# Patient Record
Sex: MALE | URBAN - METROPOLITAN AREA
[De-identification: names, ages, dates, MRNs, and addresses within clinical notes are randomized per-mention and may not be internally consistent; named-entity substitution may affect disease eponyms.]

---

## 2021-09-08 ENCOUNTER — HOSPITAL ENCOUNTER (EMERGENCY)
Facility: HOSPITAL | Age: 21
Discharge: LEFT BEFORE DISCHARGE | End: 2021-09-08

## 2021-09-08 VITALS
OXYGEN SATURATION: 98 % | WEIGHT: 180 LBS | HEART RATE: 53 BPM | SYSTOLIC BLOOD PRESSURE: 132 MMHG | RESPIRATION RATE: 16 BRPM | TEMPERATURE: 97.8 F | DIASTOLIC BLOOD PRESSURE: 62 MMHG

## 2021-09-08 NOTE — ED TRIAGE NOTES
Patient presents here with abdominal pain that he first noticed at 05:00am this morning. The pain is sharp in quality. He locates the pain to the bilateral lower quadrants. He arrived here via ambulance with [facility], Crew # [id number]. He has an IV in place and did receive Toradol IV. He notes that his pain has reduced since initial arrival.

## 2025-02-18 ENCOUNTER — HOSPITAL ENCOUNTER (INPATIENT)
Facility: CLINIC | Age: 25
LOS: 2 days | Discharge: HOME | End: 2025-02-20
Attending: EMERGENCY MEDICINE

## 2025-02-18 ENCOUNTER — APPOINTMENT (OUTPATIENT)
Dept: CT IMAGING | Facility: CLINIC | Age: 25
DRG: 439 | End: 2025-02-18
Attending: EMERGENCY MEDICINE

## 2025-02-18 DIAGNOSIS — K85.90 ACUTE PANCREATITIS, UNSPECIFIED COMPLICATION STATUS, UNSPECIFIED PANCREATITIS TYPE: ICD-10-CM

## 2025-02-18 LAB
ALBUMIN SERPL BCG-MCNC: 4.6 G/DL (ref 3.5–5.2)
ALP SERPL-CCNC: 124 U/L (ref 40–150)
ALT SERPL W P-5'-P-CCNC: 41 U/L (ref 0–70)
ANION GAP SERPL CALCULATED.3IONS-SCNC: 20 MMOL/L (ref 7–15)
AST SERPL W P-5'-P-CCNC: 52 U/L (ref 0–45)
BASOPHILS # BLD AUTO: 0.1 10E3/UL (ref 0–0.2)
BASOPHILS NFR BLD AUTO: 0 %
BILIRUB SERPL-MCNC: 1.4 MG/DL
BUN SERPL-MCNC: 10.9 MG/DL (ref 6–20)
CALCIUM SERPL-MCNC: 10.3 MG/DL (ref 8.8–10.4)
CHLORIDE SERPL-SCNC: 103 MMOL/L (ref 98–107)
CREAT SERPL-MCNC: 0.92 MG/DL (ref 0.67–1.17)
EGFRCR SERPLBLD CKD-EPI 2021: >90 ML/MIN/1.73M2
EOSINOPHIL # BLD AUTO: 0 10E3/UL (ref 0–0.7)
EOSINOPHIL NFR BLD AUTO: 0 %
ERYTHROCYTE [DISTWIDTH] IN BLOOD BY AUTOMATED COUNT: 14.2 % (ref 10–15)
GLUCOSE SERPL-MCNC: 122 MG/DL (ref 70–99)
HCO3 SERPL-SCNC: 17 MMOL/L (ref 22–29)
HCT VFR BLD AUTO: 43.2 % (ref 40–53)
HGB BLD-MCNC: 15.2 G/DL (ref 13.3–17.7)
HOLD SPECIMEN: NORMAL
HOLD SPECIMEN: NORMAL
IMM GRANULOCYTES # BLD: 0.1 10E3/UL
IMM GRANULOCYTES NFR BLD: 1 %
LIPASE SERPL-CCNC: 1149 U/L (ref 13–60)
LYMPHOCYTES # BLD AUTO: 1.1 10E3/UL (ref 0.8–5.3)
LYMPHOCYTES NFR BLD AUTO: 5 %
MAGNESIUM SERPL-MCNC: 1.7 MG/DL (ref 1.7–2.3)
MCH RBC QN AUTO: 29 PG (ref 26.5–33)
MCHC RBC AUTO-ENTMCNC: 35.2 G/DL (ref 31.5–36.5)
MCV RBC AUTO: 82 FL (ref 78–100)
MONOCYTES # BLD AUTO: 1 10E3/UL (ref 0–1.3)
MONOCYTES NFR BLD AUTO: 5 %
NEUTROPHILS # BLD AUTO: 19.3 10E3/UL (ref 1.6–8.3)
NEUTROPHILS NFR BLD AUTO: 90 %
NRBC # BLD AUTO: 0 10E3/UL
NRBC BLD AUTO-RTO: 0 /100
PLATELET # BLD AUTO: 283 10E3/UL (ref 150–450)
POTASSIUM SERPL-SCNC: 4.1 MMOL/L (ref 3.4–5.3)
PROT SERPL-MCNC: 7.9 G/DL (ref 6.4–8.3)
RBC # BLD AUTO: 5.24 10E6/UL (ref 4.4–5.9)
SODIUM SERPL-SCNC: 140 MMOL/L (ref 135–145)
WBC # BLD AUTO: 21.5 10E3/UL (ref 4–11)

## 2025-02-18 PROCEDURE — 250N000011 HC RX IP 250 OP 636: Performed by: EMERGENCY MEDICINE

## 2025-02-18 PROCEDURE — 258N000003 HC RX IP 258 OP 636: Performed by: EMERGENCY MEDICINE

## 2025-02-18 PROCEDURE — 85025 COMPLETE CBC W/AUTO DIFF WBC: CPT | Performed by: EMERGENCY MEDICINE

## 2025-02-18 PROCEDURE — 80053 COMPREHEN METABOLIC PANEL: CPT | Performed by: EMERGENCY MEDICINE

## 2025-02-18 PROCEDURE — 96375 TX/PRO/DX INJ NEW DRUG ADDON: CPT

## 2025-02-18 PROCEDURE — 96374 THER/PROPH/DIAG INJ IV PUSH: CPT

## 2025-02-18 PROCEDURE — 120N000001 HC R&B MED SURG/OB

## 2025-02-18 PROCEDURE — 83690 ASSAY OF LIPASE: CPT | Performed by: EMERGENCY MEDICINE

## 2025-02-18 PROCEDURE — 250N000013 HC RX MED GY IP 250 OP 250 PS 637: Performed by: EMERGENCY MEDICINE

## 2025-02-18 PROCEDURE — 74177 CT ABD & PELVIS W/CONTRAST: CPT

## 2025-02-18 PROCEDURE — 99285 EMERGENCY DEPT VISIT HI MDM: CPT | Mod: 25

## 2025-02-18 PROCEDURE — 83735 ASSAY OF MAGNESIUM: CPT | Performed by: EMERGENCY MEDICINE

## 2025-02-18 PROCEDURE — 96361 HYDRATE IV INFUSION ADD-ON: CPT

## 2025-02-18 PROCEDURE — 36415 COLL VENOUS BLD VENIPUNCTURE: CPT | Performed by: EMERGENCY MEDICINE

## 2025-02-18 PROCEDURE — 250N000011 HC RX IP 250 OP 636

## 2025-02-18 RX ORDER — HYDROMORPHONE HYDROCHLORIDE 1 MG/ML
0.5 INJECTION, SOLUTION INTRAMUSCULAR; INTRAVENOUS; SUBCUTANEOUS EVERY 30 MIN PRN
Status: COMPLETED | OUTPATIENT
Start: 2025-02-18 | End: 2025-02-19

## 2025-02-18 RX ORDER — HYDROMORPHONE HCL IN WATER/PF 6 MG/30 ML
0.4 PATIENT CONTROLLED ANALGESIA SYRINGE INTRAVENOUS
Status: DISCONTINUED | OUTPATIENT
Start: 2025-02-18 | End: 2025-02-20

## 2025-02-18 RX ORDER — ONDANSETRON 2 MG/ML
4 INJECTION INTRAMUSCULAR; INTRAVENOUS ONCE
Status: COMPLETED | OUTPATIENT
Start: 2025-02-18 | End: 2025-02-18

## 2025-02-18 RX ORDER — HYDROMORPHONE HCL IN WATER/PF 6 MG/30 ML
0.2 PATIENT CONTROLLED ANALGESIA SYRINGE INTRAVENOUS
Status: DISCONTINUED | OUTPATIENT
Start: 2025-02-18 | End: 2025-02-20

## 2025-02-18 RX ORDER — DICYCLOMINE HYDROCHLORIDE 10 MG/1
20 CAPSULE ORAL ONCE
Status: COMPLETED | OUTPATIENT
Start: 2025-02-18 | End: 2025-02-18

## 2025-02-18 RX ORDER — ONDANSETRON 4 MG/1
4 TABLET, ORALLY DISINTEGRATING ORAL EVERY 6 HOURS PRN
Status: DISCONTINUED | OUTPATIENT
Start: 2025-02-18 | End: 2025-02-20 | Stop reason: HOSPADM

## 2025-02-18 RX ORDER — AMOXICILLIN 250 MG
2 CAPSULE ORAL 2 TIMES DAILY PRN
Status: DISCONTINUED | OUTPATIENT
Start: 2025-02-18 | End: 2025-02-20 | Stop reason: HOSPADM

## 2025-02-18 RX ORDER — AMOXICILLIN 250 MG
1 CAPSULE ORAL 2 TIMES DAILY PRN
Status: DISCONTINUED | OUTPATIENT
Start: 2025-02-18 | End: 2025-02-20 | Stop reason: HOSPADM

## 2025-02-18 RX ORDER — PROCHLORPERAZINE MALEATE 10 MG
10 TABLET ORAL EVERY 6 HOURS PRN
Status: DISCONTINUED | OUTPATIENT
Start: 2025-02-18 | End: 2025-02-20 | Stop reason: HOSPADM

## 2025-02-18 RX ORDER — LIDOCAINE 40 MG/G
CREAM TOPICAL
Status: DISCONTINUED | OUTPATIENT
Start: 2025-02-18 | End: 2025-02-20 | Stop reason: HOSPADM

## 2025-02-18 RX ORDER — ONDANSETRON 2 MG/ML
4 INJECTION INTRAMUSCULAR; INTRAVENOUS EVERY 6 HOURS PRN
Status: DISCONTINUED | OUTPATIENT
Start: 2025-02-18 | End: 2025-02-20 | Stop reason: HOSPADM

## 2025-02-18 RX ORDER — HYDROMORPHONE HYDROCHLORIDE 1 MG/ML
0.5 INJECTION, SOLUTION INTRAMUSCULAR; INTRAVENOUS; SUBCUTANEOUS ONCE
Status: COMPLETED | OUTPATIENT
Start: 2025-02-18 | End: 2025-02-18

## 2025-02-18 RX ORDER — SODIUM CHLORIDE 9 MG/ML
INJECTION, SOLUTION INTRAVENOUS ONCE
Status: COMPLETED | OUTPATIENT
Start: 2025-02-18 | End: 2025-02-18

## 2025-02-18 RX ORDER — IOPAMIDOL 755 MG/ML
90 INJECTION, SOLUTION INTRAVASCULAR ONCE
Status: COMPLETED | OUTPATIENT
Start: 2025-02-18 | End: 2025-02-18

## 2025-02-18 RX ORDER — METOCLOPRAMIDE HYDROCHLORIDE 5 MG/ML
10 INJECTION INTRAMUSCULAR; INTRAVENOUS ONCE
Status: COMPLETED | OUTPATIENT
Start: 2025-02-18 | End: 2025-02-18

## 2025-02-18 RX ORDER — ACETAMINOPHEN 650 MG/1
650 SUPPOSITORY RECTAL EVERY 4 HOURS PRN
Status: DISCONTINUED | OUTPATIENT
Start: 2025-02-18 | End: 2025-02-20 | Stop reason: HOSPADM

## 2025-02-18 RX ORDER — ACETAMINOPHEN 325 MG/1
650 TABLET ORAL EVERY 4 HOURS PRN
Status: DISCONTINUED | OUTPATIENT
Start: 2025-02-18 | End: 2025-02-20 | Stop reason: HOSPADM

## 2025-02-18 RX ORDER — SODIUM CHLORIDE 9 MG/ML
INJECTION, SOLUTION INTRAVENOUS CONTINUOUS
Status: DISCONTINUED | OUTPATIENT
Start: 2025-02-18 | End: 2025-02-19

## 2025-02-18 RX ADMIN — IOPAMIDOL 90 ML: 755 INJECTION, SOLUTION INTRAVENOUS at 18:00

## 2025-02-18 RX ADMIN — HYDROMORPHONE HYDROCHLORIDE 0.5 MG: 1 INJECTION, SOLUTION INTRAMUSCULAR; INTRAVENOUS; SUBCUTANEOUS at 20:25

## 2025-02-18 RX ADMIN — SODIUM CHLORIDE: 9 INJECTION, SOLUTION INTRAVENOUS at 19:32

## 2025-02-18 RX ADMIN — FAMOTIDINE 20 MG: 10 INJECTION, SOLUTION INTRAVENOUS at 17:21

## 2025-02-18 RX ADMIN — HYDROMORPHONE HYDROCHLORIDE 0.5 MG: 1 INJECTION, SOLUTION INTRAMUSCULAR; INTRAVENOUS; SUBCUTANEOUS at 18:28

## 2025-02-18 RX ADMIN — ONDANSETRON 4 MG: 2 INJECTION, SOLUTION INTRAMUSCULAR; INTRAVENOUS at 18:27

## 2025-02-18 RX ADMIN — DICYCLOMINE HYDROCHLORIDE 20 MG: 10 CAPSULE ORAL at 18:29

## 2025-02-18 RX ADMIN — ONDANSETRON 4 MG: 2 INJECTION, SOLUTION INTRAMUSCULAR; INTRAVENOUS at 21:09

## 2025-02-18 RX ADMIN — METOCLOPRAMIDE 10 MG: 5 INJECTION, SOLUTION INTRAMUSCULAR; INTRAVENOUS at 17:21

## 2025-02-18 RX ADMIN — SODIUM CHLORIDE 1000 ML: 9 INJECTION, SOLUTION INTRAVENOUS at 17:20

## 2025-02-18 ASSESSMENT — COLUMBIA-SUICIDE SEVERITY RATING SCALE - C-SSRS
6. HAVE YOU EVER DONE ANYTHING, STARTED TO DO ANYTHING, OR PREPARED TO DO ANYTHING TO END YOUR LIFE?: NO
1. IN THE PAST MONTH, HAVE YOU WISHED YOU WERE DEAD OR WISHED YOU COULD GO TO SLEEP AND NOT WAKE UP?: NO
2. HAVE YOU ACTUALLY HAD ANY THOUGHTS OF KILLING YOURSELF IN THE PAST MONTH?: NO

## 2025-02-18 ASSESSMENT — ACTIVITIES OF DAILY LIVING (ADL)
ADLS_ACUITY_SCORE: 41

## 2025-02-18 NOTE — LETTER
[facility]  [address] [place] [id number]  Phone: [phone]  Fax: [phone]    February 20, 2025        Tylan D Severson  [address] [facility] [address]          To whom it may concern:    RE: Tylan D Severson    Patient was admitted and treated from 2/18-2/20 at [facility]. Please excuse him from work until 2/24/25.    Please contact me for questions or concerns.      Sincerely,      Mira Lal MD

## 2025-02-18 NOTE — ED PROVIDER NOTES
Emergency Department Encounter     Evaluation Date & Time:   No admission date for patient encounter.    CHIEF COMPLAINT:  Abdominal Pain and Nausea & Vomiting      Triage Note:Patient presents to the ED by [facility] for abdominal pain, nausea, vomiting since last night. Patient ate ramen for dinner and has been sick since. Patient has had this in the past (2 years ago) and states it may have been an ulcer. Patient got 4 mg zofran via EMS. Patient's pain is 8/10 and is burning.              ED COURSE & MEDICAL DECISION MAKING:     Pt here for evaluation of n/v/d since last night after eating Ramen. Denies any similar sick contacts.  Having lower abdominal pain/cramping. Denies fevers, afebrile here.  Pt uncomfortable on exam, but relatively benign abdomen. Will get labs, treat symptomatically.  Denies heavy cannabis or recent heavy etoh use.  No previous presentations for this. Consideration for gastroenteritis vs colitis/enteritis vs other pathology.  Likely needs symptomatic cares in Ed, home with meds pending workup.    ED Course as of 02/18/25 2010 Tue Feb 18, 2025 1732 I met the patient and performed my initial interview and exam.   1755 Lipase elevated to 1149, consistent with pancreatitis.  Will plan for hospitalization.  IV dilaudid ordered for pain control.   1830 Ct showing pancreatitis, consistent with labs. Pt in transfer queue.     1831 Pt resting in bed in hallway.   1914 No beds in system, will admit here.  Pt updated, agreeable.   1940 I spoke with [facility] resident, accepts for med/surg hospitalization.  Pt aware of plan, agreeable.  Overall improved with pain/nausea meds here.         Medical Decision Making    History:  Supplemental history from: Documented in chart  External Record(s) reviewed: Documented in chart    Work Up:  Chart documentation includes differential considered and any EKGs or imaging independently interpreted by provider, where specified.  In additional to work up  documented, I considered the following work up: Documented in chart, if applicable.    External consultation:  Discussion of management with another provider: Documented in chart, if applicable    Complicating factors:  Care impacted by chronic illness: Documented in Chart  Care affected by social determinants of health: N/A    Disposition considerations: Admit.    Not Applicable     At the conclusion of the encounter I discussed the results of all the tests and the disposition. The questions were answered. The patient or family acknowledged understanding and was agreeable with the care plan.      MEDICATIONS GIVEN IN THE EMERGENCY DEPARTMENT:  Medications   HYDROmorphone (PF) (DILAUDID) injection 0.5 mg (has no administration in time range)   metoclopramide (REGLAN) injection 10 mg (10 mg Intravenous $Given 2/18/25 1721)   famotidine (PEPCID) injection 20 mg (20 mg Intravenous $Given 2/18/25 1721)   sodium chloride 0.9% BOLUS 1,000 mL (0 mLs Intravenous Stopped 2/18/25 1831)   ondansetron (ZOFRAN) injection 4 mg (4 mg Intravenous $Given 2/18/25 1827)   dicyclomine (BENTYL) capsule 20 mg (20 mg Oral $Given 2/18/25 1829)   iopamidol (ISOVUE-370) solution 90 mL (90 mLs Intravenous $Given 2/18/25 1800)   HYDROmorphone (PF) (DILAUDID) injection 0.5 mg (0.5 mg Intravenous $Given 2/18/25 1828)   sodium chloride 0.9 % infusion ( Intravenous $New Bag 2/18/25 1932)       NEW PRESCRIPTIONS STARTED AT TODAY'S ED VISIT:  New Prescriptions    No medications on file       HPI   HPI     Tylan D Severson is a 24 year old male with no pertinent history on file who presents to this ED via EMS for evaluation of abdominal pain with nausea/vomiting.    Last night, the patient started having diffuse abdominal pain with more pain in the lower section that worsens with inhalation. This was accompanied by nausea and non-bloody emesis. He also had watery diarrhea without blood, too. Today, the pain continued and worsened. He was unable to  "eat today, but was able to keep down ibuprofen and Tylenol though these didn't help; last medication use was 1.5 hours ago. He presents for this ongoing pain.     He had no new foods yesterday. He was recently around family who had RSV and walking pneumonia. He has no history of abdominal surgeries, and denies recent fever, cough, rhinorrhea, or congestion.     He last had alcohol on 14-Feb-2025. He denies regular use of cannabis.    REVIEW OF SYSTEMS:  See HPI      Medical History   History reviewed. No pertinent past medical history.    History reviewed. No pertinent surgical history.    History reviewed. No pertinent family history.         No current outpatient medications on file.      Physical Exam     Vitals:  /63   Pulse 73   Temp 97.7  F (36.5  C) (Oral)   Resp 22   Ht 1.854 m (6' 1\")   Wt 93 kg (205 lb)   SpO2 100%   BMI 27.05 kg/m      PHYSICAL EXAM:   Physical Exam  Vitals and nursing note reviewed.   Constitutional:       General: He is not in acute distress.     Appearance: Normal appearance.   HENT:      Head: Normocephalic and atraumatic.      Nose: Nose normal.      Mouth/Throat:      Mouth: Mucous membranes are moist.   Eyes:      Pupils: Pupils are equal, round, and reactive to light.   Cardiovascular:      Rate and Rhythm: Normal rate and regular rhythm.      Pulses: Normal pulses.           Radial pulses are 2+ on the right side and 2+ on the left side.        Dorsalis pedis pulses are 2+ on the right side and 2+ on the left side.   Pulmonary:      Effort: Pulmonary effort is normal. No respiratory distress.      Breath sounds: Normal breath sounds.   Abdominal:      General: There is no distension.      Palpations: Abdomen is soft.      Tenderness: There is abdominal tenderness (lower abdomen, mild).      Comments: No abdominal rigidity   Musculoskeletal:      Cervical back: Full passive range of motion without pain and neck supple.      Comments: No calf tenderness or swelling b/l "   Skin:     General: Skin is warm.      Findings: No rash.   Neurological:      General: No focal deficit present.      Mental Status: He is alert. Mental status is at baseline.      Comments: Fluent speech, no acute lateralizing deficits   Psychiatric:         Mood and Affect: Mood is anxious.         Behavior: Behavior normal.         Results     LAB:  All pertinent labs reviewed and interpreted  Labs Ordered and Resulted from Time of ED Arrival to Time of ED Departure   COMPREHENSIVE METABOLIC PANEL - Abnormal       Result Value    Sodium 140      Potassium 4.1      Carbon Dioxide (CO2) 17 (*)     Anion Gap 20 (*)     Urea Nitrogen 10.9      Creatinine 0.92      GFR Estimate >90      Calcium 10.3      Chloride 103      Glucose 122 (*)     Alkaline Phosphatase 124      AST 52 (*)     ALT 41      Protein Total 7.9      Albumin 4.6      Bilirubin Total 1.4 (*)    LIPASE - Abnormal    Lipase 1,149 (*)    CBC WITH PLATELETS AND DIFFERENTIAL - Abnormal    WBC Count 21.5 (*)     RBC Count 5.24      Hemoglobin 15.2      Hematocrit 43.2      MCV 82      MCH 29.0      MCHC 35.2      RDW 14.2      Platelet Count 283      % Neutrophils 90      % Lymphocytes 5      % Monocytes 5      % Eosinophils 0      % Basophils 0      % Immature Granulocytes 1      NRBCs per 100 WBC 0      Absolute Neutrophils 19.3 (*)     Absolute Lymphocytes 1.1      Absolute Monocytes 1.0      Absolute Eosinophils 0.0      Absolute Basophils 0.1      Absolute Immature Granulocytes 0.1      Absolute NRBCs 0.0     MAGNESIUM - Normal    Magnesium 1.7         RADIOLOGY:  CT Abdomen Pelvis w Contrast   Final Result   IMPRESSION:    1.  Acute interstitial pancreatitis. Currently no evidence for complications.   2.  Fatty liver.   3.  Small amount of free pelvic fluid.                   ECG:  none    PROCEDURES:  Procedures:  none      FINAL IMPRESSION:    ICD-10-CM    1. Acute pancreatitis, unspecified complication status, unspecified pancreatitis type   K85.90           0 minutes of critical care time      I, Gordon Erickson, am serving as a scribe to document services personally performed by Dr. New Silva, based on my observations and the provider's statements to me. I, New Silva, DO attest that Gordon Erickson is acting in a scribe capacity, has observed my performance of the services and has documented them in accordance with my direction.      New Silva DO  Emergency Medicine  [facility] EMERGENCY ROOM  2/18/2025  5:14 PM          New Silva MD  02/18/25 2010

## 2025-02-18 NOTE — ED TRIAGE NOTES
Patient presents to the ED by [facility] for abdominal pain, nausea, vomiting since last night. Patient ate ramen for dinner and has been sick since. Patient has had this in the past (2 years ago) and states it may have been an ulcer. Patient got 4 mg zofran via EMS. Patient's pain is 8/10 and is burning.

## 2025-02-19 LAB
ALBUMIN SERPL BCG-MCNC: 4 G/DL (ref 3.5–5.2)
ALP SERPL-CCNC: 96 U/L (ref 40–150)
ALT SERPL W P-5'-P-CCNC: 28 U/L (ref 0–70)
ANION GAP SERPL CALCULATED.3IONS-SCNC: 13 MMOL/L (ref 7–15)
AST SERPL W P-5'-P-CCNC: 37 U/L (ref 0–45)
BASOPHILS # BLD AUTO: 0 10E3/UL (ref 0–0.2)
BASOPHILS NFR BLD AUTO: 0 %
BILIRUB SERPL-MCNC: 1.2 MG/DL
BUN SERPL-MCNC: 9.3 MG/DL (ref 6–20)
CALCIUM SERPL-MCNC: 9 MG/DL (ref 8.8–10.4)
CHLORIDE SERPL-SCNC: 102 MMOL/L (ref 98–107)
CHOLEST SERPL-MCNC: 197 MG/DL
CREAT SERPL-MCNC: 0.84 MG/DL (ref 0.67–1.17)
EGFRCR SERPLBLD CKD-EPI 2021: >90 ML/MIN/1.73M2
EOSINOPHIL # BLD AUTO: 0 10E3/UL (ref 0–0.7)
EOSINOPHIL NFR BLD AUTO: 0 %
ERYTHROCYTE [DISTWIDTH] IN BLOOD BY AUTOMATED COUNT: 14.4 % (ref 10–15)
FLUAV RNA SPEC QL NAA+PROBE: NEGATIVE
FLUBV RNA RESP QL NAA+PROBE: NEGATIVE
GLUCOSE SERPL-MCNC: 118 MG/DL (ref 70–99)
HCO3 SERPL-SCNC: 23 MMOL/L (ref 22–29)
HCT VFR BLD AUTO: 38.2 % (ref 40–53)
HDLC SERPL-MCNC: 57 MG/DL
HGB BLD-MCNC: 13.6 G/DL (ref 13.3–17.7)
IMM GRANULOCYTES # BLD: 0.1 10E3/UL
IMM GRANULOCYTES NFR BLD: 1 %
LDLC SERPL CALC-MCNC: 118 MG/DL
LYMPHOCYTES # BLD AUTO: 1.2 10E3/UL (ref 0.8–5.3)
LYMPHOCYTES NFR BLD AUTO: 7 %
MCH RBC QN AUTO: 29.6 PG (ref 26.5–33)
MCHC RBC AUTO-ENTMCNC: 35.6 G/DL (ref 31.5–36.5)
MCV RBC AUTO: 83 FL (ref 78–100)
MONOCYTES # BLD AUTO: 1 10E3/UL (ref 0–1.3)
MONOCYTES NFR BLD AUTO: 6 %
NEUTROPHILS # BLD AUTO: 15 10E3/UL (ref 1.6–8.3)
NEUTROPHILS NFR BLD AUTO: 87 %
NONHDLC SERPL-MCNC: 140 MG/DL
NRBC # BLD AUTO: 0 10E3/UL
NRBC BLD AUTO-RTO: 0 /100
PLATELET # BLD AUTO: 200 10E3/UL (ref 150–450)
POTASSIUM SERPL-SCNC: 3.8 MMOL/L (ref 3.4–5.3)
PROT SERPL-MCNC: 6.7 G/DL (ref 6.4–8.3)
RBC # BLD AUTO: 4.6 10E6/UL (ref 4.4–5.9)
RSV RNA SPEC NAA+PROBE: NEGATIVE
SARS-COV-2 RNA RESP QL NAA+PROBE: NEGATIVE
SODIUM SERPL-SCNC: 138 MMOL/L (ref 135–145)
TRIGL SERPL-MCNC: 109 MG/DL
WBC # BLD AUTO: 17.3 10E3/UL (ref 4–11)

## 2025-02-19 PROCEDURE — 250N000011 HC RX IP 250 OP 636: Mod: JZ

## 2025-02-19 PROCEDURE — 258N000003 HC RX IP 258 OP 636

## 2025-02-19 PROCEDURE — 250N000011 HC RX IP 250 OP 636

## 2025-02-19 PROCEDURE — 120N000001 HC R&B MED SURG/OB

## 2025-02-19 PROCEDURE — 85041 AUTOMATED RBC COUNT: CPT

## 2025-02-19 PROCEDURE — 36415 COLL VENOUS BLD VENIPUNCTURE: CPT

## 2025-02-19 PROCEDURE — 87637 SARSCOV2&INF A&B&RSV AMP PRB: CPT

## 2025-02-19 PROCEDURE — 99222 1ST HOSP IP/OBS MODERATE 55: CPT | Mod: AI

## 2025-02-19 PROCEDURE — 250N000013 HC RX MED GY IP 250 OP 250 PS 637

## 2025-02-19 PROCEDURE — 85004 AUTOMATED DIFF WBC COUNT: CPT

## 2025-02-19 PROCEDURE — 80061 LIPID PANEL: CPT

## 2025-02-19 PROCEDURE — 84075 ASSAY ALKALINE PHOSPHATASE: CPT

## 2025-02-19 PROCEDURE — 82310 ASSAY OF CALCIUM: CPT

## 2025-02-19 RX ORDER — NALOXONE HYDROCHLORIDE 0.4 MG/ML
0.4 INJECTION, SOLUTION INTRAMUSCULAR; INTRAVENOUS; SUBCUTANEOUS
Status: DISCONTINUED | OUTPATIENT
Start: 2025-02-19 | End: 2025-02-20 | Stop reason: HOSPADM

## 2025-02-19 RX ORDER — NALOXONE HYDROCHLORIDE 0.4 MG/ML
0.2 INJECTION, SOLUTION INTRAMUSCULAR; INTRAVENOUS; SUBCUTANEOUS
Status: DISCONTINUED | OUTPATIENT
Start: 2025-02-19 | End: 2025-02-20 | Stop reason: HOSPADM

## 2025-02-19 RX ADMIN — HYDROMORPHONE HYDROCHLORIDE 0.4 MG: 0.2 INJECTION, SOLUTION INTRAMUSCULAR; INTRAVENOUS; SUBCUTANEOUS at 14:54

## 2025-02-19 RX ADMIN — SODIUM CHLORIDE: 9 INJECTION, SOLUTION INTRAVENOUS at 03:16

## 2025-02-19 RX ADMIN — HYDROMORPHONE HYDROCHLORIDE 0.4 MG: 0.2 INJECTION, SOLUTION INTRAMUSCULAR; INTRAVENOUS; SUBCUTANEOUS at 09:14

## 2025-02-19 RX ADMIN — HYDROMORPHONE HYDROCHLORIDE 0.4 MG: 0.2 INJECTION, SOLUTION INTRAMUSCULAR; INTRAVENOUS; SUBCUTANEOUS at 00:14

## 2025-02-19 RX ADMIN — SODIUM CHLORIDE: 9 INJECTION, SOLUTION INTRAVENOUS at 14:44

## 2025-02-19 RX ADMIN — SENNOSIDES AND DOCUSATE SODIUM 2 TABLET: 50; 8.6 TABLET ORAL at 14:52

## 2025-02-19 RX ADMIN — HYDROMORPHONE HYDROCHLORIDE 0.5 MG: 1 INJECTION, SOLUTION INTRAMUSCULAR; INTRAVENOUS; SUBCUTANEOUS at 03:19

## 2025-02-19 RX ADMIN — ONDANSETRON 4 MG: 2 INJECTION, SOLUTION INTRAMUSCULAR; INTRAVENOUS at 19:20

## 2025-02-19 RX ADMIN — ONDANSETRON 4 MG: 2 INJECTION, SOLUTION INTRAMUSCULAR; INTRAVENOUS at 00:15

## 2025-02-19 RX ADMIN — HYDROMORPHONE HYDROCHLORIDE 0.4 MG: 0.2 INJECTION, SOLUTION INTRAMUSCULAR; INTRAVENOUS; SUBCUTANEOUS at 19:20

## 2025-02-19 RX ADMIN — PROCHLORPERAZINE EDISYLATE 10 MG: 5 INJECTION INTRAMUSCULAR; INTRAVENOUS at 03:20

## 2025-02-19 RX ADMIN — ONDANSETRON 4 MG: 2 INJECTION, SOLUTION INTRAMUSCULAR; INTRAVENOUS at 09:15

## 2025-02-19 RX ADMIN — PROCHLORPERAZINE EDISYLATE 10 MG: 5 INJECTION INTRAMUSCULAR; INTRAVENOUS at 14:54

## 2025-02-19 ASSESSMENT — ACTIVITIES OF DAILY LIVING (ADL)
ADLS_ACUITY_SCORE: 46
ADLS_ACUITY_SCORE: 41
ADLS_ACUITY_SCORE: 46
ADLS_ACUITY_SCORE: 41
ADLS_ACUITY_SCORE: 46
ADLS_ACUITY_SCORE: 41
ADLS_ACUITY_SCORE: 46
ADLS_ACUITY_SCORE: 41
ADLS_ACUITY_SCORE: 41

## 2025-02-19 NOTE — H&P
"    [facility]    History and Physical - Hospitalist Service       Date of Admission:  2/18/2025    Assessment & Plan      Tylan D Severson is a 24 year old male admitted on 2/18/2025. He has no pertinent past medical history and is admitted for acute pancreatitis    Emergency department workup:  -White blood cell count elevated 21.5.  Lipase elevated at 1149.  Bilirubin mildly elevated at 1.4.  Mild AST elevation at 52.  ALT normal at 41.  CT abdomen pelvis 2/18/2025   IMPRESSION:   1.  Acute interstitial pancreatitis. Currently no evidence for complications.  2.  Fatty liver.  3.  Small amount of free pelvic fluid.      Acute interstitial pancreatitis  -Admit to inpatient  -IV fluids normal saline 125 mL/h  -Clear liquid diet  -Pain management with heat or ice, Tylenol, IV Dilaudid.  Recommend transition to p.o. control when able  -Zofran/Compazine as needed for nausea  -AM CBC  -AM CMP  -AM Lipid  -Social work consulted           Diet: Combination Diet Clear Liquid  DVT Prophylaxis: Ambulate every shift  Hernandez Catheter: Not present  Fluids: IVF  Lines: None     Cardiac Monitoring: None  Code Status: Full Code    Clinically Significant Risk Factors Present on Admission              # Anion Gap Metabolic Acidosis: Highest Anion Gap = 20 mmol/L in last 2 days, will monitor and treat as appropriate                # Overweight: Estimated body mass index is 27.05 kg/m  as calculated from the following:    Height as of this encounter: 1.854 m (6' 1\").    Weight as of this encounter: 93 kg (205 lb).              Disposition Plan      Expected Discharge Date: 02/20/2025                The patient's care was discussed with the Attending Physician, Dr. Robert Steiner MD. Patient will be seen in Am by Dr. Issa Pang MD .      Casey Cramer DO  Hospitalist Service  [facility]  Securely message with [employer] (more info)  Text page via [employer] Paging/Directory "   ______________________________________________________________________    Chief Complaint   Abdominal Pain    History is obtained from the patient    History of Present Illness   Tylan D Severson is a 24 year old male who presents with 1 day history of diffuse abdominal pain, present in the epigastric region.  He reports that he consumed about 5-6 shots of alcohol on 2/14/2025, but no alcohol since then.  He reports that he regularly consumes alcohol, typically on the weekends and when he does so, he has about 5 or 6 standard alcoholic beverages.  Reports in 2021 he presented to the emergency department with abdominal pain, but left prior to being seen.  Reports no other chronic medical concerns.  He denies recreational drug use.  Denies tobacco use, but does vape.  Affirms abdominal pain, nausea.  He denies headache, fever, chills, shortness of breath, chest pain.      Past Medical History    History reviewed. No pertinent past medical history.    Past Surgical History   History reviewed. No pertinent surgical history.    Prior to Admission Medications   None        Physical Exam   Vital Signs: Temp: 97.7  F (36.5  C) Temp src: Oral BP: 132/63 Pulse: 73   Resp: 22 SpO2: 100 % O2 Device: None (Room air)    Weight: 205 lbs 0 oz    Physical Exam  Constitutional:       General: He is not in acute distress.     Appearance: He is not toxic-appearing or diaphoretic.   HENT:      Head: Normocephalic and atraumatic.      Right Ear: External ear normal.      Left Ear: External ear normal.      Nose: Nose normal.      Mouth/Throat:      Mouth: Mucous membranes are moist.   Eyes:      General: No scleral icterus.     Conjunctiva/sclera: Conjunctivae normal.   Cardiovascular:      Rate and Rhythm: Normal rate and regular rhythm.      Heart sounds: No murmur heard.     No friction rub. No gallop.   Pulmonary:      Effort: Pulmonary effort is normal. No respiratory distress.      Breath sounds: No wheezing or rales.    Abdominal:      General: There is no distension.      Tenderness: There is abdominal tenderness. There is guarding.      Comments: Tenderness to palpation of epigastric region and lower abdominal quadrants.    Musculoskeletal:      Right lower leg: No edema.      Left lower leg: No edema.   Neurological:      General: No focal deficit present.      Mental Status: He is alert and oriented to person, place, and time.   Psychiatric:         Mood and Affect: Mood normal.         Behavior: Behavior normal.          Medical Decision Making       Please see A&P for additional details of medical decision making.      Data     I have personally reviewed the following data over the past 24 hrs:    21.5 (H)  \   15.2   / 283     140 103 10.9 /  122 (H)   4.1 17 (L) 0.92 \     ALT: 41 AST: 52 (H) AP: 124 TBILI: 1.4 (H)   ALB: 4.6 TOT PROTEIN: 7.9 LIPASE: 1,149 (H)       Imaging results reviewed over the past 24 hrs:   Recent Results (from the past 24 hours)   CT Abdomen Pelvis w Contrast    Narrative    EXAM: CT ABDOMEN PELVIS W CONTRAST  LOCATION: [facility]  DATE: 2/18/2025    INDICATION: vomiting, abdominal pain, leukocytosis  COMPARISON: None.  TECHNIQUE: CT scan of the abdomen and pelvis was performed following injection of IV contrast. Multiplanar reformats were obtained. Dose reduction techniques were used.  CONTRAST: 90ml Isovue 370    FINDINGS:   LOWER CHEST: Normal.    HEPATOBILIARY: Mild diffuse fatty infiltration of the liver.    PANCREAS: Inflammatory changes involving the head, body, and tail of the pancreas with edema extending into the adjacent mesentery and along the right and left  pararenal fascia. No evidence for pancreatic necrosis. No localized fluid collections.    SPLEEN: Normal.    ADRENAL GLANDS: Normal.    KIDNEYS/BLADDER: Normal.    BOWEL: Narrowing of the gastric antrum and descending portion of the duodenum adjacent to the inflamed pancreatic head. Currently no evidence  for gastric obstruction. Normal appendix.    LYMPH NODES: Normal.    VASCULATURE: Normal.    PELVIC ORGANS: Small amount of free pelvic fluid.    MUSCULOSKELETAL: Normal.      Impression    IMPRESSION:   1.  Acute interstitial pancreatitis. Currently no evidence for complications.  2.  Fatty liver.  3.  Small amount of free pelvic fluid.

## 2025-02-19 NOTE — ED NOTES
Bed: [facility]ED-06  Expected date: 2/18/25  Expected time: 8:25 PM  Means of arrival:   Comments:  Bed B

## 2025-02-19 NOTE — ED NOTES
PRIMARY DIAGNOSIS: ACUTE PAIN  OUTPATIENT/OBSERVATION GOALS TO BE MET BEFORE DISCHARGE:  1. Pain Status: Improved but still requiring IV narcotics.    2. Return to near baseline physical activity: No    3. Cleared for discharge by consultants (if involved): No    Discharge Planner Nurse   Safe discharge environment identified: Yes  Barriers to discharge:  Patient still complaining of abdominal pain 8/10 and nausea pain managed with PRN dilaudid. Patient also endorses abdominal bloating and constipation. PRN senna administered. Patient tried clear liquids but developed severe pain and nausea after eating. Will keep clear liquids tonight and try to advance diet tomorrow.         Entered by: Kranthi Carter RN 02/19/2025 5:55 PM     Please review provider order for any additional goals.   Nurse to notify provider when observation goals have been met and patient is ready for discharge.

## 2025-02-19 NOTE — PROGRESS NOTES
"[facility]    Progress Note - Hospitalist Service       Date of Admission:  2/18/2025    Assessment & Plan   Tylan D Severson is a 24 year old male admitted on 2/18/2025. He has no significant past medical history and is admitted for acute pancreatitis.     Acute Interstitial Pancreatitis  Presented with acute worsening epigastric pain on 2/18, ED workup revealed elevated lipase to 1149 and CT abdomen notable for acute interstitial pancreatitis without complication. CMP within normal limits suggesting away from gallstone pancreatitis or hypercalcemia, no recent trauma or instrumentation. Triglycerides WNL. He endorses binge drinking on the weekend, alcoholic pancreatitis most likely.   - IV  mL/h   - Clear liquid diet, advance as tolerated  - Pain control:   - Heat/Ice  - PO Tylenol 650 q4h PRN  - IV Dilaudid 0.2-0.4 q2h PRN  - Zofran and compazine for nausea          Diet: Advance Diet as Tolerated: Clear Liquid Diet; Regular Diet Adult    DVT Prophylaxis: Ambulate every shift  Hernandez Catheter: Not present  Fluids: IV  ml/h  Lines: None     Cardiac Monitoring: None  Code Status: Full Code      Clinically Significant Risk Factors Present on Admission              # Anion Gap Metabolic Acidosis: Highest Anion Gap = 20 mmol/L in last 2 days, will monitor and treat as appropriate                # Overweight: Estimated body mass index is 27.05 kg/m  as calculated from the following:    Height as of this encounter: 1.854 m (6' 1\").    Weight as of this encounter: 93 kg (205 lb).              Social Drivers of Health   Tobacco Use: Medium Risk (6/10/2022)    Received from [facility]    Patient History     Smoking Tobacco Use: Former     Smokeless Tobacco Use: Never    Received from [employer]    Social Connections         Disposition Plan     Medically Ready for Discharge: Anticipated Tomorrow       The patient's " care was discussed with the Attending Physician, Dr. Pang .    Mira Lal MD  Hospitalist Service  [facility]  Securely message with [employer] (more info)  Text page via [email or web address] Paging/Directory   ______________________________________________________________________    Interval History   No acute events following admission, still experiencing considerable epigastric pain. Attempted to drink broth with increase in pain. Nausea relieved by antiemetics ordered. He endorses drinking 6-7 shots of alcohol once a weekend in a social setting. He is motivated by his current pain to further reduce or stop drinking alcohol.     Physical Exam   Vital Signs: Temp: 98.6  F (37  C) Temp src: Oral BP: (!) 168/98 Pulse: 103   Resp: 19 SpO2: 98 % O2 Device: None (Room air)    Weight: 205 lbs 0 oz    Physical Exam  Constitutional:       Appearance: He is obese.   Cardiovascular:      Rate and Rhythm: Normal rate and regular rhythm.      Pulses: Normal pulses.      Heart sounds: No murmur heard.  Pulmonary:      Effort: Pulmonary effort is normal.      Breath sounds: Normal breath sounds.   Abdominal:      General: Abdomen is flat. There is no distension.      Palpations: Abdomen is soft.      Tenderness: There is abdominal tenderness. There is guarding. There is no rebound.      Comments: Mid-epigastric, LLQ, RLQ pain, voluntary and anticipatory guarding   Musculoskeletal:      Right lower leg: No edema.      Left lower leg: No edema.   Skin:     General: Skin is warm and dry.   Neurological:      Mental Status: He is alert.         Data     I have personally reviewed the following data over the past 24 hrs:    17.3 (H)  \   13.6   / 200     138 102 9.3 /  118 (H)   3.8 23 0.84 \     ALT: 28 AST: 37 AP: 96 TBILI: 1.2   ALB: 4.0 TOT PROTEIN: 6.7 LIPASE: 1,149 (H)       Imaging results reviewed over the past 24 hrs:   Recent Results (from the past 24 hours)   CT Abdomen Pelvis w Contrast    Narrative     EXAM: CT ABDOMEN PELVIS W CONTRAST  LOCATION: [facility]  DATE: 2/18/2025    INDICATION: vomiting, abdominal pain, leukocytosis  COMPARISON: None.  TECHNIQUE: CT scan of the abdomen and pelvis was performed following injection of IV contrast. Multiplanar reformats were obtained. Dose reduction techniques were used.  CONTRAST: 90ml Isovue 370    FINDINGS:   LOWER CHEST: Normal.    HEPATOBILIARY: Mild diffuse fatty infiltration of the liver.    PANCREAS: Inflammatory changes involving the head, body, and tail of the pancreas with edema extending into the adjacent mesentery and along the right and left  pararenal fascia. No evidence for pancreatic necrosis. No localized fluid collections.    SPLEEN: Normal.    ADRENAL GLANDS: Normal.    KIDNEYS/BLADDER: Normal.    BOWEL: Narrowing of the gastric antrum and descending portion of the duodenum adjacent to the inflamed pancreatic head. Currently no evidence for gastric obstruction. Normal appendix.    LYMPH NODES: Normal.    VASCULATURE: Normal.    PELVIC ORGANS: Small amount of free pelvic fluid.    MUSCULOSKELETAL: Normal.      Impression    IMPRESSION:   1.  Acute interstitial pancreatitis. Currently no evidence for complications.  2.  Fatty liver.  3.  Small amount of free pelvic fluid.

## 2025-02-19 NOTE — MEDICATION SCRIBE - ADMISSION MEDICATION HISTORY
Medication Scribe Admission Medication History    Admission medication history is complete. The information provided in this note is only as accurate as the sources available at the time of the update.    Information Source(s): Patient and CareEverywhere/SureScripts via in-person    Pertinent Information: Patient reports no Rx medications and no OTC products at this time. No known allergies per patient.     No dispensed medications found on file and does not appear to be seen regularly in clinic correlating with the patient reporting no Rx/OTC medications at this time.     Changes made to PTA medication list:  Added: None  Deleted: None  Changed: None    Allergies reviewed with patient and updates made in EHR: yes    Medication History Completed By: Eron Garsia 2/18/2025 7:35 PM    No outpatient medications have been marked as taking for the 2/18/25 encounter (Hospital Encounter).

## 2025-02-20 VITALS
WEIGHT: 205 LBS | BODY MASS INDEX: 27.17 KG/M2 | TEMPERATURE: 99.2 F | HEIGHT: 73 IN | OXYGEN SATURATION: 96 % | HEART RATE: 103 BPM | SYSTOLIC BLOOD PRESSURE: 136 MMHG | DIASTOLIC BLOOD PRESSURE: 75 MMHG | RESPIRATION RATE: 16 BRPM

## 2025-02-20 PROCEDURE — 250N000011 HC RX IP 250 OP 636: Mod: JZ

## 2025-02-20 PROCEDURE — 250N000013 HC RX MED GY IP 250 OP 250 PS 637

## 2025-02-20 PROCEDURE — 99238 HOSP IP/OBS DSCHRG MGMT 30/<: CPT | Mod: GC

## 2025-02-20 RX ORDER — ONDANSETRON 4 MG/1
4 TABLET, ORALLY DISINTEGRATING ORAL EVERY 6 HOURS PRN
Qty: 30 TABLET | Refills: 0 | Status: SHIPPED | OUTPATIENT
Start: 2025-02-20 | End: 2025-02-20

## 2025-02-20 RX ORDER — METOCLOPRAMIDE 5 MG/1
10 TABLET ORAL EVERY 6 HOURS PRN
Status: DISCONTINUED | OUTPATIENT
Start: 2025-02-20 | End: 2025-02-20

## 2025-02-20 RX ORDER — SIMETHICONE 80 MG
80 TABLET,CHEWABLE ORAL EVERY 6 HOURS PRN
Status: DISCONTINUED | OUTPATIENT
Start: 2025-02-20 | End: 2025-02-20 | Stop reason: HOSPADM

## 2025-02-20 RX ORDER — ONDANSETRON 4 MG/1
4 TABLET, ORALLY DISINTEGRATING ORAL EVERY 6 HOURS PRN
Qty: 30 TABLET | Refills: 0 | Status: SHIPPED | OUTPATIENT
Start: 2025-02-20

## 2025-02-20 RX ORDER — OXYCODONE HYDROCHLORIDE 5 MG/1
5 TABLET ORAL EVERY 4 HOURS PRN
Qty: 5 TABLET | Refills: 0 | Status: SHIPPED | OUTPATIENT
Start: 2025-02-20

## 2025-02-20 RX ORDER — OXYCODONE HYDROCHLORIDE 5 MG/1
5 TABLET ORAL EVERY 4 HOURS PRN
Status: DISCONTINUED | OUTPATIENT
Start: 2025-02-20 | End: 2025-02-20 | Stop reason: HOSPADM

## 2025-02-20 RX ADMIN — OXYCODONE 5 MG: 5 TABLET ORAL at 10:14

## 2025-02-20 RX ADMIN — SIMETHICONE 80 MG: 80 TABLET, CHEWABLE ORAL at 11:08

## 2025-02-20 RX ADMIN — OXYCODONE 5 MG: 5 TABLET ORAL at 14:52

## 2025-02-20 RX ADMIN — HYDROMORPHONE HYDROCHLORIDE 0.4 MG: 0.2 INJECTION, SOLUTION INTRAMUSCULAR; INTRAVENOUS; SUBCUTANEOUS at 05:28

## 2025-02-20 RX ADMIN — HYDROMORPHONE HYDROCHLORIDE 0.4 MG: 0.2 INJECTION, SOLUTION INTRAMUSCULAR; INTRAVENOUS; SUBCUTANEOUS at 01:16

## 2025-02-20 ASSESSMENT — ACTIVITIES OF DAILY LIVING (ADL)
ADLS_ACUITY_SCORE: 46
ADLS_ACUITY_SCORE: 46
ADLS_ACUITY_SCORE: 51
ADLS_ACUITY_SCORE: 25
ADLS_ACUITY_SCORE: 46
ADLS_ACUITY_SCORE: 25
ADLS_ACUITY_SCORE: 51
ADLS_ACUITY_SCORE: 51
ADLS_ACUITY_SCORE: 46
ADLS_ACUITY_SCORE: 51
ADLS_ACUITY_SCORE: 25
ADLS_ACUITY_SCORE: 46
ADLS_ACUITY_SCORE: 51
ADLS_ACUITY_SCORE: 46
ADLS_ACUITY_SCORE: 25

## 2025-02-20 NOTE — PROGRESS NOTES
Plan for patient to discharge today, ok to discharger per MD, patient given work note per request.  Patient's sister, Jazmine given update and will provide transport. Report given to Ronald RHODES.

## 2025-02-20 NOTE — PLAN OF CARE
"Goal Outcome Evaluation:  Patient alert and oriented to room, call light system, rounding.  Patient reported abdominal pain 8, given prn po oxycodone.  Patient now resting.  Plan to give prn simethicone once available for gas pains per patient request.  Patient's VS: BP (!) 155/91 (BP Location: Right arm, Patient Position: Semi-Mcclure's, Cuff Size: Adult Regular)   Pulse 103   Temp 98.3  F (36.8  C) (Oral)   Resp 16   Ht 1.854 m (6' 1\")   Wt 93 kg (205 lb)   SpO2 98%   BMI 27.05 kg/m  on room air, continuous oximetry in use.  Patient denies dizziness, tolerating ambulating independently.  Plan for patient to try regular diet.  Discharge pending.         Problem: Adult Inpatient Plan of Care  Goal: Absence of Hospital-Acquired Illness or Injury  Intervention: Identify and Manage Fall Risk  Recent Flowsheet Documentation  Taken 2/20/2025 1000 by Nadia Lee RN  Safety Promotion/Fall Prevention: safety round/check completed  Taken 2/20/2025 0935 by Nadia Lee RN  Safety Promotion/Fall Prevention:   clutter free environment maintained   increased rounding and observation   increase visualization of patient   lighting adjusted   mobility aid in reach   nonskid shoes/slippers when out of bed   patient and family education   room near nurse's station   room organization consistent   safety round/check completed   supervised activity   toileting scheduled     Problem: Pancreatitis  Goal: Optimal Pain Control and Function  Intervention: Monitor and Manage Pain  Recent Flowsheet Documentation  Taken 2/20/2025 0935 by Nadia Lee RN  Pain Management Interventions:   repositioned   medication (see MAR)  Sleep/Rest Enhancement: natural light exposure provided                    "

## 2025-02-20 NOTE — DISCHARGE SUMMARY
"[facility]  Discharge Summary - Medicine & Pediatrics       Date of Admission:  2/18/2025  Date of Discharge:  2/20/2025  Discharging Provider: Issa Pang MD  Discharge Service: Hospitalist Service    Discharge Diagnoses   Acute interstitial pancreatitis    Clinically Significant Risk Factors     # Overweight: Estimated body mass index is 27.05 kg/m  as calculated from the following:    Height as of this encounter: 1.854 m (6' 1\").    Weight as of this encounter: 93 kg (205 lb).       Follow-ups Needed After Discharge   Follow-up Appointments       Follow-up and recommended labs and tests       Follow up with primary care provider, Physician No Ref-Primary, within 7 days for hospital follow- up.  No follow up labs or test are needed.   Please follow up on high blood pressure which was noted throughout admission.            PCP to do:  -No new daily medications were started, patient was hypertensive requiring outpatient monitoring.    Unresulted Labs Ordered in the Past 30 Days of this Admission       No orders found from 1/19/2025 to 2/19/2025.            Discharge Disposition   Discharged to home  Condition at discharge: Stable    Hospital Course   Tylan D Severson was admitted on 2/18/2025 for acute interstitial pancreatitis.  The following problems were addressed during his hospitalization:    Deny had worsening epigastric pain on 2/18.  ED workup positive for elevated lipase and CT abdomen showed acute interstitial pancreatitis without complication.  Differential for etiology included gallstone pancreatitis, hypertriglyceridemia, hypercalcemia, or recent trauma.  Clinical workup was negative for the above.  Patient endorsed binge drinking behavior on 2/14, which most likely contributed to this acute episode of pancreatitis.  This is the first time he has been hospitalized for this problem, however he has had pain such as this in the past.    He was started on maintenance fluid, " given IV pain medication, and advanced his diet as tolerated.  On the day of discharge she was tolerating oral pain medication and was able to drink enough to maintain hydration.  He was also tolerating a soft diet.  Nausea was well-controlled with PRNs.  Deny was comfortable with discharge, and was informed of the risk of recurrent pancreatitis if he were to continue that his current level of alcohol consumption.    Consultations This Hospital Stay   CARE MANAGEMENT / SOCIAL WORK IP CONSULT    Code Status   Full Code       The patient was discussed with Dr. Bird Lal MD  Resident hospitalist service  [address] [address]  Phone: [phone]  Fax: [phone]  ______________________________________________________________________    Physical Exam   Vital Signs: Temp: 99.2  F (37.3  C) Temp src: Oral BP: 136/75 Pulse: 103   Resp: 16 SpO2: 96 % O2 Device: None (Room air)    Weight: 205 lbs 0 oz  Physical Exam  Constitutional:       General: He is not in acute distress.     Appearance: He is obese.   Cardiovascular:      Rate and Rhythm: Normal rate and regular rhythm.      Pulses: Normal pulses.   Pulmonary:      Effort: Pulmonary effort is normal.      Breath sounds: Normal breath sounds.   Abdominal:      General: Abdomen is flat. Bowel sounds are normal.      Palpations: Abdomen is soft.      Tenderness: There is abdominal tenderness. There is no guarding or rebound.      Comments: Diffuse tenderness to palpation, most intense in the mid epigastric region, left lower quadrant, and right lower quadrant.   Musculoskeletal:      Right lower leg: No edema.      Left lower leg: No edema.   Skin:     General: Skin is warm and dry.   Neurological:      Mental Status: He is alert and oriented to person, place, and time.             Primary Care Physician   Physician No Ref-Primary    Discharge Orders      Reason for your hospital  stay    Acute pancreatitis     Follow-up and recommended labs and tests     Follow up with primary care provider, Physician No Ref-Primary, within 7 days for hospital follow- up.  No follow up labs or test are needed.   Please follow up on high blood pressure which was noted throughout admission.     Activity    Your activity upon discharge: activity as tolerated     Diet    Follow this diet upon discharge: Current Diet:Orders Placed This Encounter      Advance Diet as Tolerated: Clear Liquid Diet; Regular Diet Adult       Significant Results and Procedures   Results for orders placed or performed during the hospital encounter of 02/18/25   CT Abdomen Pelvis w Contrast    Narrative    EXAM: CT ABDOMEN PELVIS W CONTRAST  LOCATION: [facility]  DATE: 2/18/2025    INDICATION: vomiting, abdominal pain, leukocytosis  COMPARISON: None.  TECHNIQUE: CT scan of the abdomen and pelvis was performed following injection of IV contrast. Multiplanar reformats were obtained. Dose reduction techniques were used.  CONTRAST: 90ml Isovue 370    FINDINGS:   LOWER CHEST: Normal.    HEPATOBILIARY: Mild diffuse fatty infiltration of the liver.    PANCREAS: Inflammatory changes involving the head, body, and tail of the pancreas with edema extending into the adjacent mesentery and along the right and left  pararenal fascia. No evidence for pancreatic necrosis. No localized fluid collections.    SPLEEN: Normal.    ADRENAL GLANDS: Normal.    KIDNEYS/BLADDER: Normal.    BOWEL: Narrowing of the gastric antrum and descending portion of the duodenum adjacent to the inflamed pancreatic head. Currently no evidence for gastric obstruction. Normal appendix.    LYMPH NODES: Normal.    VASCULATURE: Normal.    PELVIC ORGANS: Small amount of free pelvic fluid.    MUSCULOSKELETAL: Normal.      Impression    IMPRESSION:   1.  Acute interstitial pancreatitis. Currently no evidence for complications.  2.  Fatty liver.  3.  Small  amount of free pelvic fluid.       Discharge Medications   Current Discharge Medication List        START taking these medications    Details   ondansetron (ZOFRAN ODT) 4 MG ODT tab Take 1 tablet (4 mg) by mouth every 6 hours as needed for nausea or vomiting.  Qty: 30 tablet, Refills: 0    Associated Diagnoses: Acute pancreatitis, unspecified complication status, unspecified pancreatitis type      oxyCODONE (ROXICODONE) 5 MG tablet Take 1 tablet (5 mg) by mouth every 4 hours as needed for moderate pain.  Qty: 5 tablet, Refills: 0    Associated Diagnoses: Acute pancreatitis, unspecified complication status, unspecified pancreatitis type           Allergies   No Known Allergies

## 2025-02-20 NOTE — PROVIDER NOTIFICATION
Gorge BLANCO    WW-ED 06; Severson Tylan, MRN: [id number]: Patient Dx is pancreatitis. He has been on 125 ml/hr NS since yesterday, is taking fluids. Current diet is clear liquid to be advanced as tolerated. Is it okay to discontinue the IVF or decrease the flow rate? Thanks    MD has discontinued the continuous IVF.

## 2025-02-20 NOTE — PROGRESS NOTES
Patient is discharge home in stable condition. Accompanied by sister. AVS reviewed with patient and sister; verbalized understanding.Written education on pancreatitis and low fiber diet also given to patient. Valuables and belongings sent home with patient.

## 2025-02-20 NOTE — PLAN OF CARE
Problem: Pancreatitis  Goal: Optimal Pain Control and Function  Intervention: Monitor and Manage Pain  Recent Flowsheet Documentation  Taken 2/19/2025 1918 by Terrance Renee RN  Pain Management Interventions: medication (see MAR)     Problem: Pancreatitis  Goal: Fluid and Electrolyte Balance  Outcome: Progressing   Goal Outcome Evaluation:         Report received from outgoing nurse at shift change, introduced self to patient, appears A&O X4, is vitally stable on room air. Initial assessment done ( See flow sheets), medication orders effected. Bed locked in low position, white board updated. C/o 7/10 abdominal pain, IV Dilaudid administered and IV Zofran for nausea with relief from Sx. IVF discontinued after consulting resident. Patient able to tolerate fluids. He has a rash on his face that id itchy, no adjustments needed. He has abdominal tenderness and guarding, is passing gas.   Nursing patient education done on use of call light. Call light within reach, patient acknowledges understanding, calls appropriately for cares.

## 2025-04-18 ENCOUNTER — HOSPITAL ENCOUNTER (EMERGENCY)
Facility: CLINIC | Age: 25
Discharge: HOME | End: 2025-04-18

## 2025-04-18 ENCOUNTER — APPOINTMENT (OUTPATIENT)
Dept: ULTRASOUND IMAGING | Facility: CLINIC | Age: 25
End: 2025-04-18

## 2025-04-18 ENCOUNTER — APPOINTMENT (OUTPATIENT)
Dept: CT IMAGING | Facility: CLINIC | Age: 25
End: 2025-04-18

## 2025-04-18 VITALS
DIASTOLIC BLOOD PRESSURE: 107 MMHG | RESPIRATION RATE: 24 BRPM | TEMPERATURE: 98 F | OXYGEN SATURATION: 100 % | WEIGHT: 200 LBS | SYSTOLIC BLOOD PRESSURE: 169 MMHG | HEART RATE: 99 BPM | HEIGHT: 74 IN | BODY MASS INDEX: 25.67 KG/M2

## 2025-04-18 DIAGNOSIS — K85.90 ACUTE PANCREATITIS: ICD-10-CM

## 2025-04-18 LAB
ALBUMIN SERPL BCG-MCNC: 4.8 G/DL (ref 3.5–5.2)
ALBUMIN UR-MCNC: 30 MG/DL
ALP SERPL-CCNC: 110 U/L (ref 40–150)
ALT SERPL W P-5'-P-CCNC: 71 U/L (ref 0–70)
ANION GAP SERPL CALCULATED.3IONS-SCNC: 13 MMOL/L (ref 7–15)
APPEARANCE UR: CLEAR
AST SERPL W P-5'-P-CCNC: 42 U/L (ref 0–45)
BASOPHILS # BLD AUTO: 0 10E3/UL (ref 0–0.2)
BASOPHILS NFR BLD AUTO: 0 %
BILIRUB DIRECT SERPL-MCNC: 0.18 MG/DL (ref 0–0.3)
BILIRUB SERPL-MCNC: 0.4 MG/DL
BILIRUB UR QL STRIP: NEGATIVE
BUN SERPL-MCNC: 5.9 MG/DL (ref 6–20)
CALCIUM SERPL-MCNC: 10.1 MG/DL (ref 8.8–10.4)
CHLORIDE SERPL-SCNC: 104 MMOL/L (ref 98–107)
COLOR UR AUTO: YELLOW
CREAT SERPL-MCNC: 0.75 MG/DL (ref 0.67–1.17)
CRP SERPL-MCNC: <3 MG/L
EGFRCR SERPLBLD CKD-EPI 2021: >90 ML/MIN/1.73M2
EOSINOPHIL # BLD AUTO: 0 10E3/UL (ref 0–0.7)
EOSINOPHIL NFR BLD AUTO: 0 %
ERYTHROCYTE [DISTWIDTH] IN BLOOD BY AUTOMATED COUNT: 13.1 % (ref 10–15)
GLUCOSE SERPL-MCNC: 103 MG/DL (ref 70–99)
GLUCOSE UR STRIP-MCNC: NEGATIVE MG/DL
HCO3 SERPL-SCNC: 24 MMOL/L (ref 22–29)
HCT VFR BLD AUTO: 41.9 % (ref 40–53)
HGB BLD-MCNC: 14.7 G/DL (ref 13.3–17.7)
HGB UR QL STRIP: NEGATIVE
IMM GRANULOCYTES # BLD: 0 10E3/UL
IMM GRANULOCYTES NFR BLD: 0 %
KETONES UR STRIP-MCNC: NEGATIVE MG/DL
LEUKOCYTE ESTERASE UR QL STRIP: NEGATIVE
LIPASE SERPL-CCNC: 132 U/L (ref 13–60)
LYMPHOCYTES # BLD AUTO: 1.4 10E3/UL (ref 0.8–5.3)
LYMPHOCYTES NFR BLD AUTO: 12 %
MCH RBC QN AUTO: 28.7 PG (ref 26.5–33)
MCHC RBC AUTO-ENTMCNC: 35.1 G/DL (ref 31.5–36.5)
MCV RBC AUTO: 82 FL (ref 78–100)
MONOCYTES # BLD AUTO: 0.6 10E3/UL (ref 0–1.3)
MONOCYTES NFR BLD AUTO: 5 %
MUCOUS THREADS #/AREA URNS LPF: PRESENT /LPF
NEUTROPHILS # BLD AUTO: 9.9 10E3/UL (ref 1.6–8.3)
NEUTROPHILS NFR BLD AUTO: 83 %
NITRATE UR QL: NEGATIVE
NRBC # BLD AUTO: 0 10E3/UL
NRBC BLD AUTO-RTO: 0 /100
PH UR STRIP: 6.5 [PH] (ref 5–7)
PLATELET # BLD AUTO: 384 10E3/UL (ref 150–450)
POTASSIUM SERPL-SCNC: 4.2 MMOL/L (ref 3.4–5.3)
PROT SERPL-MCNC: 8.4 G/DL (ref 6.4–8.3)
RBC # BLD AUTO: 5.12 10E6/UL (ref 4.4–5.9)
RBC URINE: 1 /HPF
SODIUM SERPL-SCNC: 141 MMOL/L (ref 135–145)
SP GR UR STRIP: 1.02 (ref 1–1.03)
SQUAMOUS EPITHELIAL: <1 /HPF
UROBILINOGEN UR STRIP-MCNC: NORMAL MG/DL
WBC # BLD AUTO: 11.9 10E3/UL (ref 4–11)
WBC URINE: 3 /HPF

## 2025-04-18 PROCEDURE — 36415 COLL VENOUS BLD VENIPUNCTURE: CPT

## 2025-04-18 PROCEDURE — 96361 HYDRATE IV INFUSION ADD-ON: CPT

## 2025-04-18 PROCEDURE — 74177 CT ABD & PELVIS W/CONTRAST: CPT

## 2025-04-18 PROCEDURE — 86140 C-REACTIVE PROTEIN: CPT

## 2025-04-18 PROCEDURE — 80053 COMPREHEN METABOLIC PANEL: CPT

## 2025-04-18 PROCEDURE — 81003 URINALYSIS AUTO W/O SCOPE: CPT | Performed by: EMERGENCY MEDICINE

## 2025-04-18 PROCEDURE — 76705 ECHO EXAM OF ABDOMEN: CPT

## 2025-04-18 PROCEDURE — 83690 ASSAY OF LIPASE: CPT

## 2025-04-18 PROCEDURE — 96375 TX/PRO/DX INJ NEW DRUG ADDON: CPT

## 2025-04-18 PROCEDURE — 81001 URINALYSIS AUTO W/SCOPE: CPT | Performed by: EMERGENCY MEDICINE

## 2025-04-18 PROCEDURE — 85004 AUTOMATED DIFF WBC COUNT: CPT

## 2025-04-18 PROCEDURE — 250N000011 HC RX IP 250 OP 636: Mod: JZ

## 2025-04-18 PROCEDURE — 258N000003 HC RX IP 258 OP 636

## 2025-04-18 PROCEDURE — 96374 THER/PROPH/DIAG INJ IV PUSH: CPT | Mod: 59

## 2025-04-18 PROCEDURE — 82248 BILIRUBIN DIRECT: CPT

## 2025-04-18 PROCEDURE — 99285 EMERGENCY DEPT VISIT HI MDM: CPT | Mod: 25

## 2025-04-18 PROCEDURE — 96376 TX/PRO/DX INJ SAME DRUG ADON: CPT

## 2025-04-18 PROCEDURE — 85041 AUTOMATED RBC COUNT: CPT

## 2025-04-18 PROCEDURE — 250N000011 HC RX IP 250 OP 636

## 2025-04-18 RX ORDER — KETOROLAC TROMETHAMINE 15 MG/ML
15 INJECTION, SOLUTION INTRAMUSCULAR; INTRAVENOUS ONCE
Status: COMPLETED | OUTPATIENT
Start: 2025-04-18 | End: 2025-04-18

## 2025-04-18 RX ORDER — OXYCODONE HYDROCHLORIDE 5 MG/1
5 TABLET ORAL EVERY 6 HOURS PRN
Qty: 12 TABLET | Refills: 0 | Status: SHIPPED | OUTPATIENT
Start: 2025-04-18 | End: 2025-04-21

## 2025-04-18 RX ORDER — ONDANSETRON 4 MG/1
4 TABLET, ORALLY DISINTEGRATING ORAL EVERY 8 HOURS PRN
Qty: 20 TABLET | Refills: 0 | Status: SHIPPED | OUTPATIENT
Start: 2025-04-18

## 2025-04-18 RX ORDER — ONDANSETRON 2 MG/ML
4 INJECTION INTRAMUSCULAR; INTRAVENOUS ONCE
Status: COMPLETED | OUTPATIENT
Start: 2025-04-18 | End: 2025-04-18

## 2025-04-18 RX ORDER — IOPAMIDOL 755 MG/ML
90 INJECTION, SOLUTION INTRAVASCULAR ONCE
Status: COMPLETED | OUTPATIENT
Start: 2025-04-18 | End: 2025-04-18

## 2025-04-18 RX ORDER — HYDROMORPHONE HYDROCHLORIDE 1 MG/ML
0.5 INJECTION, SOLUTION INTRAMUSCULAR; INTRAVENOUS; SUBCUTANEOUS ONCE
Status: COMPLETED | OUTPATIENT
Start: 2025-04-18 | End: 2025-04-18

## 2025-04-18 RX ADMIN — IOPAMIDOL 90 ML: 755 INJECTION, SOLUTION INTRAVENOUS at 14:03

## 2025-04-18 RX ADMIN — SODIUM CHLORIDE 1000 ML: 0.9 INJECTION, SOLUTION INTRAVENOUS at 13:02

## 2025-04-18 RX ADMIN — ONDANSETRON 4 MG: 2 INJECTION, SOLUTION INTRAMUSCULAR; INTRAVENOUS at 14:16

## 2025-04-18 RX ADMIN — KETOROLAC TROMETHAMINE 15 MG: 15 INJECTION, SOLUTION INTRAMUSCULAR; INTRAVENOUS at 13:02

## 2025-04-18 RX ADMIN — HYDROMORPHONE HYDROCHLORIDE 0.5 MG: 1 INJECTION, SOLUTION INTRAMUSCULAR; INTRAVENOUS; SUBCUTANEOUS at 14:16

## 2025-04-18 RX ADMIN — HYDROMORPHONE HYDROCHLORIDE 0.5 MG: 1 INJECTION, SOLUTION INTRAMUSCULAR; INTRAVENOUS; SUBCUTANEOUS at 17:31

## 2025-04-18 ASSESSMENT — ACTIVITIES OF DAILY LIVING (ADL)
ADLS_ACUITY_SCORE: 51

## 2025-04-18 ASSESSMENT — COLUMBIA-SUICIDE SEVERITY RATING SCALE - C-SSRS
1. IN THE PAST MONTH, HAVE YOU WISHED YOU WERE DEAD OR WISHED YOU COULD GO TO SLEEP AND NOT WAKE UP?: NO
2. HAVE YOU ACTUALLY HAD ANY THOUGHTS OF KILLING YOURSELF IN THE PAST MONTH?: NO
6. HAVE YOU EVER DONE ANYTHING, STARTED TO DO ANYTHING, OR PREPARED TO DO ANYTHING TO END YOUR LIFE?: NO

## 2025-04-18 NOTE — DISCHARGE INSTRUCTIONS
You are seen in the emergency department for abdominal pain.  Your work appears reassuring but does show that you have mild inflammation of your pancreas which I do think is causing your symptoms.  It can be from alcohol use.  We did do an ultrasound of your gallbladder to be sure that it was not from gallstones.  You do have some gallstones in your bladder but no evidence of obstructing stones or inflammation of the gallbladder so I do not think the gallbladder is the cause of the pancreatitis.  Will treat your pain and nausea with medications.  Try to rest your bowels today by just having clear liquids including Gatorade, water, soup/broth.  Tomorrow you can slowly advance your diet with some mild food items.  If you develop any new or worsening symptoms, return to the emergency department.  
88.5

## 2025-04-18 NOTE — Clinical Note
Tylan Severson was seen and treated in our emergency department on 4/18/2025.  He may return to work on 04/21/2025.       If you have any questions or concerns, please don't hesitate to call.      Alejandrina Mehta PA-C

## 2025-04-18 NOTE — ED PROVIDER NOTES
EMERGENCY DEPARTMENT ENCOUNTER      NAME: Tylan D Severson  AGE: 24 year old male  YOB: 2000  MRN: [id number]  EVALUATION DATE & TIME: No admission date for patient encounter.    PCP: No Ref-Primary, Physician    ED PROVIDER: Alejandrina Mehta PA-C    CHIEF COMPLAINT  Flank Pain and Abdominal Pain      FINAL IMPRESSION:      ICD-10-CM    1. Acute pancreatitis  K85.90           MEDICAL DECISION MAKING AND ED COURSE:  Pertinent Labs & Imaging studies reviewed (See chart for details)     Tylan D Severson is a 24 year old male with a pertinent history of alcoholic pancreatitis who presents to this ED by walk in for evaluation of abdominal and flank pain.  Hypertensive at 169/98.  Afebrile.  No tachycardia, hypoxia, tachypnea.  On exam, patient is uncomfortable appearing, tearful but nontoxic.  No distress.  Tenderness to palpation to the epigastric and left upper quadrant as well as suprapubic region without guarding or rebound.  No CVA tenderness.  Cardiopulmonary examination unremarkable. Considering pancreatitis, ureterolithiasis, cholecystitis, choledocholithiasis, small bowel obstruction, UTI, among many considered.  Will obtain, imaging, and give meds.    Urine without blood or evidence of infection.  CRP less than 3. Mild ALT elevation at 71 but normal AST and bilirubin.  Electrolytes, kidney function, anion gap within normal limits.  Mild leukocytosis 11.9.  No anemia.  CT abdomen pelvis with changes suggestive of a mild interstitial pancreatitis. Incidental, short segment of a proximal small bowel intussusception in the left upper quadrant without any obstruction or inflammatory changes. Spoke to Dr. Mojica with general surgery who felt that this will resolve on its own and was not concerning.  When I updated the patient on the results and checked his pain level, he reported he was now having right upper quadrant pain which he was not having on my initial exam and was tender with guarding on  palpation.  Although the CT did not show any evidence of acute cholecystitis, I want to pursue ultrasound imaging as it is a better imaging study for this region.  Right upper quadrant ultrasound with few, small mobile gallstones without evidence of cholecystitis. Right upper quadrant and midline epigastric pain to transducer pressure is due to the mild changes of pancreatitis.     At this point, findings consistent with acute pancreatitis, likely alcoholic.  Patient reports that after his last hospitalization, he stopped drinking for about 1 month and then started drinking again.  Not as heavily as he was prior to that but still some.  Last drink was a few days ago.  Right before the pain started.  He is, however, tolerating p.o. here and pain is improved with IV pain medications.  Discussed admission versus discharge and patient felt like he could manage his symptoms at home which I think is reasonable given that his lipase is not 3 times upper limit of normal.  Likely is trending down given that he has had several days of abdominal pain.  Will recommend clear liquid diet and slowly progressing his diet, oxycodone, Zofran, and strict return precautions.  I did offer a GI follow-up with patient is uninsured and said he would not be able to attend the appointment.  He was discharged in stable condition.  All questions answered.    MEDICATIONS GIVEN IN THE EMERGENCY:  Medications   sodium chloride 0.9% BOLUS 1,000 mL (0 mLs Intravenous Stopped 4/18/25 1505)   ketorolac (TORADOL) injection 15 mg (15 mg Intravenous $Given 4/18/25 1302)   HYDROmorphone (PF) (DILAUDID) injection 0.5 mg (0.5 mg Intravenous $Given 4/18/25 1416)   ondansetron (ZOFRAN) injection 4 mg (4 mg Intravenous $Given 4/18/25 1416)   iopamidol (ISOVUE-370) solution 90 mL (90 mLs Intravenous $Given 4/18/25 1403)   HYDROmorphone (PF) (DILAUDID) injection 0.5 mg (0.5 mg Intravenous $Given 4/18/25 1731)       NEW PRESCRIPTIONS STARTED AT TODAY'S ER  "VISIT  New Prescriptions    ONDANSETRON (ZOFRAN ODT) 4 MG ODT TAB    Take 1 tablet (4 mg) by mouth every 8 hours as needed for nausea or vomiting.    OXYCODONE (ROXICODONE) 5 MG TABLET    Take 1 tablet (5 mg) by mouth every 6 hours as needed for pain.     Modified Medications    No medications on file       =================================================================    HPI    Patient information was obtained from: patient   Use of : N/A     Tylan D Severson is a 24 year old male with a pertinent history of alcoholic pancreatitis who presents to this ED by walk in for evaluation of abdominal and flank pain.     Patient reports bilateral flank/back pain x 2-3 days and at 4am woke up with severe periumbilical/suprapubic abdominal pain. Feels the pain moves from back to front to back but also wraps around. Reports nausea and vomiting. Denies diarrhea or constipation, melena, hematochezia, hematemesis. Reports decreased urinary output but denies hematuria, dysuria, frequency, urgency, fevers, cough, rhinorrhea, congestion, sore throat, chest pain, shortness of breath. Denies hx of similar symptoms. Uses marijuana recreationally, not daily. Used to drink alcohol more often but after pancreatitis in February, cut back. Last drink a few days ago, one glass of wine.     PHYSICAL EXAM    BP (!) 169/98   Pulse 86   Temp 98  F (36.7  C) (Oral)   Resp 24   Ht 1.88 m (6' 2\")   Wt 90.7 kg (200 lb)   SpO2 100%   BMI 25.68 kg/m    Physical Exam  Vitals and nursing note reviewed.   Constitutional:       General: He is not in acute distress.     Appearance: He is not ill-appearing.      Comments: Uncomfortable appearing, tearful, in no distress   HENT:      Mouth/Throat:      Mouth: Mucous membranes are moist.   Eyes:      Extraocular Movements: Extraocular movements intact.   Cardiovascular:      Rate and Rhythm: Normal rate and regular rhythm.      Heart sounds: Normal heart sounds.   Pulmonary:      Effort: " Pulmonary effort is normal. No respiratory distress.      Breath sounds: Normal breath sounds. No stridor. No wheezing, rhonchi or rales.   Chest:      Chest wall: No tenderness.   Abdominal:      General: Abdomen is flat. There is no distension.      Palpations: Abdomen is soft.      Tenderness: There is abdominal tenderness in the right upper quadrant, epigastric area, suprapubic area and left upper quadrant. There is guarding. There is no right CVA tenderness, left CVA tenderness or rebound.      Hernia: No hernia is present.   Skin:     General: Skin is warm.      Capillary Refill: Capillary refill takes less than 2 seconds.   Neurological:      General: No focal deficit present.      Mental Status: He is alert.   Psychiatric:         Mood and Affect: Mood is anxious.          LAB:  All pertinent labs reviewed and interpreted.  Results for orders placed or performed during the hospital encounter of 04/18/25   CT Abdomen Pelvis w Contrast    Impression    IMPRESSION:   1.  Changes suggestive of a very, mild interstitial pancreatitis. Far more extensive changes pancreatitis noted on the February study.  2.  Incidental, short segment of a proximal small bowel intussusception in the left upper quadrant without any obstruction or inflammatory changes. These can be seen transiently in patients.   US Abdomen Limited    Impression    IMPRESSION:  1.  There are a few, small mobile gallstones without evidence of cholecystitis.  2.  Right upper quadrant and midline  epigastric pain to transducer pressure is due to the mild changes of pancreatitis.       UA with Microscopic reflex to Culture    Specimen: Urine, Midstream   Result Value Ref Range    Color Urine Yellow Colorless, Straw, Light Yellow, Yellow    Appearance Urine Clear Clear    Glucose Urine Negative Negative mg/dL    Bilirubin Urine Negative Negative    Ketones Urine Negative Negative mg/dL    Specific Gravity Urine 1.024 1.001 - 1.030    Blood Urine Negative  Negative    pH Urine 6.5 5.0 - 7.0    Protein Albumin Urine 30 (A) Negative mg/dL    Urobilinogen Urine Normal Normal mg/dL    Nitrite Urine Negative Negative    Leukocyte Esterase Urine Negative Negative    Mucus Urine Present (A) None Seen /LPF    RBC Urine 1 <=2 /HPF    WBC Urine 3 <=5 /HPF    Squamous Epithelials Urine <1 <=1 /HPF   Basic metabolic panel   Result Value Ref Range    Sodium 141 135 - 145 mmol/L    Potassium 4.2 3.4 - 5.3 mmol/L    Chloride 104 98 - 107 mmol/L    Carbon Dioxide (CO2) 24 22 - 29 mmol/L    Anion Gap 13 7 - 15 mmol/L    Urea Nitrogen 5.9 (L) 6.0 - 20.0 mg/dL    Creatinine 0.75 0.67 - 1.17 mg/dL    GFR Estimate >90 >60 mL/min/1.73m2    Calcium 10.1 8.8 - 10.4 mg/dL    Glucose 103 (H) 70 - 99 mg/dL   Result Value Ref Range    CRP Inflammation <3.00 <5.00 mg/L   Hepatic function panel   Result Value Ref Range    Protein Total 8.4 (H) 6.4 - 8.3 g/dL    Albumin 4.8 3.5 - 5.2 g/dL    Bilirubin Total 0.4 <=1.2 mg/dL    Alkaline Phosphatase 110 40 - 150 U/L    AST 42 0 - 45 U/L    ALT 71 (H) 0 - 70 U/L    Bilirubin Direct 0.18 0.00 - 0.30 mg/dL   Result Value Ref Range    Lipase 132 (H) 13 - 60 U/L   CBC with platelets and differential   Result Value Ref Range    WBC Count 11.9 (H) 4.0 - 11.0 10e3/uL    RBC Count 5.12 4.40 - 5.90 10e6/uL    Hemoglobin 14.7 13.3 - 17.7 g/dL    Hematocrit 41.9 40.0 - 53.0 %    MCV 82 78 - 100 fL    MCH 28.7 26.5 - 33.0 pg    MCHC 35.1 31.5 - 36.5 g/dL    RDW 13.1 10.0 - 15.0 %    Platelet Count 384 150 - 450 10e3/uL    % Neutrophils 83 %    % Lymphocytes 12 %    % Monocytes 5 %    % Eosinophils 0 %    % Basophils 0 %    % Immature Granulocytes 0 %    NRBCs per 100 WBC 0 <1 /100    Absolute Neutrophils 9.9 (H) 1.6 - 8.3 10e3/uL    Absolute Lymphocytes 1.4 0.8 - 5.3 10e3/uL    Absolute Monocytes 0.6 0.0 - 1.3 10e3/uL    Absolute Eosinophils 0.0 0.0 - 0.7 10e3/uL    Absolute Basophils 0.0 0.0 - 0.2 10e3/uL    Absolute Immature Granulocytes 0.0 <=0.4 10e3/uL     Absolute NRBCs 0.0 10e3/uL       RADIOLOGY:  Reviewed all pertinent imaging. Please see official radiology report.  US Abdomen Limited   Final Result   IMPRESSION:   1.  There are a few, small mobile gallstones without evidence of cholecystitis.   2.  Right upper quadrant and midline  epigastric pain to transducer pressure is due to the mild changes of pancreatitis.            CT Abdomen Pelvis w Contrast   Final Result   IMPRESSION:    1.  Changes suggestive of a very, mild interstitial pancreatitis. Far more extensive changes pancreatitis noted on the February study.   2.  Incidental, short segment of a proximal small bowel intussusception in the left upper quadrant without any obstruction or inflammatory changes. These can be seen transiently in patients.          Medical Decision Making  I reviewed the EMR: Outpatient Record: Hospitalization 2/18/2025 for acute pancreatitis.  Discharge. I prescribed additional prescription strength medication(s) as charted. See documentation for any additional details.    MIPS (CTPE, Dental pain, Hernandez, Sinusitis, Asthma/COPD, Head Trauma):     SEPSIS: None      Alejandrina Mehta PA-C  [facility] EMERGENCY ROOM  [address] [address] [phone]          Alejandrina Mehta PA-C  04/18/25 3672

## 2025-04-18 NOTE — ED TRIAGE NOTES
Pt presents to the ED from work with flank pain and abd pain that started yesterday. Pt is tearful in triage. Pt reports unable to void much. Pt has hx of pancreatitis in February.      Triage Assessment (Adult)       Row Name 04/18/25 1249          Triage Assessment    Airway WDL WDL        Respiratory WDL    Respiratory WDL WDL        Skin Circulation/Temperature WDL    Skin Circulation/Temperature WDL WDL        Cardiac WDL    Cardiac WDL WDL        Peripheral/Neurovascular WDL    Peripheral Neurovascular WDL WDL        Cognitive/Neuro/Behavioral WDL    Cognitive/Neuro/Behavioral WDL WDL

## 2025-05-08 ENCOUNTER — HOSPITAL ENCOUNTER (OUTPATIENT)
Facility: CLINIC | Age: 25
Setting detail: OBSERVATION
End: 2025-05-08
Attending: STUDENT IN AN ORGANIZED HEALTH CARE EDUCATION/TRAINING PROGRAM | Admitting: HOSPITALIST

## 2025-05-08 ENCOUNTER — APPOINTMENT (OUTPATIENT)
Dept: CT IMAGING | Facility: CLINIC | Age: 25
End: 2025-05-08
Attending: STUDENT IN AN ORGANIZED HEALTH CARE EDUCATION/TRAINING PROGRAM

## 2025-05-08 ENCOUNTER — ANESTHESIA EVENT (OUTPATIENT)
Dept: SURGERY | Facility: CLINIC | Age: 25
End: 2025-05-08

## 2025-05-08 ENCOUNTER — APPOINTMENT (OUTPATIENT)
Dept: ULTRASOUND IMAGING | Facility: CLINIC | Age: 25
End: 2025-05-08
Attending: STUDENT IN AN ORGANIZED HEALTH CARE EDUCATION/TRAINING PROGRAM

## 2025-05-08 ENCOUNTER — ANESTHESIA (OUTPATIENT)
Dept: SURGERY | Facility: CLINIC | Age: 25
End: 2025-05-08

## 2025-05-08 VITALS
OXYGEN SATURATION: 98 % | BODY MASS INDEX: 27.05 KG/M2 | HEART RATE: 83 BPM | TEMPERATURE: 97.6 F | WEIGHT: 204.1 LBS | DIASTOLIC BLOOD PRESSURE: 75 MMHG | HEIGHT: 73 IN | RESPIRATION RATE: 18 BRPM | SYSTOLIC BLOOD PRESSURE: 134 MMHG

## 2025-05-08 DIAGNOSIS — K81.0 ACUTE CHOLECYSTITIS: ICD-10-CM

## 2025-05-08 DIAGNOSIS — K76.0 HEPATIC STEATOSIS: Primary | ICD-10-CM

## 2025-05-08 LAB
ALBUMIN SERPL BCG-MCNC: 4.8 G/DL (ref 3.5–5.2)
ALP SERPL-CCNC: 105 U/L (ref 40–150)
ALT SERPL W P-5'-P-CCNC: 68 U/L (ref 0–70)
ANION GAP SERPL CALCULATED.3IONS-SCNC: 20 MMOL/L (ref 7–15)
AST SERPL W P-5'-P-CCNC: 47 U/L (ref 0–45)
BASOPHILS # BLD AUTO: 0 10E3/UL (ref 0–0.2)
BASOPHILS NFR BLD AUTO: 0 %
BILIRUB DIRECT SERPL-MCNC: 0.12 MG/DL (ref 0–0.3)
BILIRUB SERPL-MCNC: 0.3 MG/DL
BUN SERPL-MCNC: 6.7 MG/DL (ref 6–20)
CALCIUM SERPL-MCNC: 8.9 MG/DL (ref 8.8–10.4)
CHLORIDE SERPL-SCNC: 103 MMOL/L (ref 98–107)
CREAT SERPL-MCNC: 0.76 MG/DL (ref 0.67–1.17)
EGFRCR SERPLBLD CKD-EPI 2021: >90 ML/MIN/1.73M2
EOSINOPHIL # BLD AUTO: 0.1 10E3/UL (ref 0–0.7)
EOSINOPHIL NFR BLD AUTO: 0 %
ERYTHROCYTE [DISTWIDTH] IN BLOOD BY AUTOMATED COUNT: 12.9 % (ref 10–15)
GLUCOSE SERPL-MCNC: 112 MG/DL (ref 70–99)
HCO3 SERPL-SCNC: 18 MMOL/L (ref 22–29)
HCT VFR BLD AUTO: 44.2 % (ref 40–53)
HGB BLD-MCNC: 15.6 G/DL (ref 13.3–17.7)
HOLD SPECIMEN: NORMAL
HOLD SPECIMEN: NORMAL
IMM GRANULOCYTES # BLD: 0.1 10E3/UL
IMM GRANULOCYTES NFR BLD: 1 %
LIPASE SERPL-CCNC: 38 U/L (ref 13–60)
LYMPHOCYTES # BLD AUTO: 2.4 10E3/UL (ref 0.8–5.3)
LYMPHOCYTES NFR BLD AUTO: 18 %
MCH RBC QN AUTO: 28.7 PG (ref 26.5–33)
MCHC RBC AUTO-ENTMCNC: 35.3 G/DL (ref 31.5–36.5)
MCV RBC AUTO: 81 FL (ref 78–100)
MONOCYTES # BLD AUTO: 0.6 10E3/UL (ref 0–1.3)
MONOCYTES NFR BLD AUTO: 5 %
NEUTROPHILS # BLD AUTO: 10 10E3/UL (ref 1.6–8.3)
NEUTROPHILS NFR BLD AUTO: 76 %
NRBC # BLD AUTO: 0 10E3/UL
NRBC BLD AUTO-RTO: 0 /100
PLATELET # BLD AUTO: 315 10E3/UL (ref 150–450)
POTASSIUM SERPL-SCNC: 4.2 MMOL/L (ref 3.4–5.3)
PROT SERPL-MCNC: 8.2 G/DL (ref 6.4–8.3)
RBC # BLD AUTO: 5.44 10E6/UL (ref 4.4–5.9)
SODIUM SERPL-SCNC: 141 MMOL/L (ref 135–145)
WBC # BLD AUTO: 13.2 10E3/UL (ref 4–11)

## 2025-05-08 PROCEDURE — 258N000003 HC RX IP 258 OP 636: Performed by: SPECIALIST

## 2025-05-08 PROCEDURE — 250N000011 HC RX IP 250 OP 636: Performed by: SPECIALIST

## 2025-05-08 PROCEDURE — 36415 COLL VENOUS BLD VENIPUNCTURE: CPT | Performed by: STUDENT IN AN ORGANIZED HEALTH CARE EDUCATION/TRAINING PROGRAM

## 2025-05-08 PROCEDURE — 258N000003 HC RX IP 258 OP 636: Performed by: ANESTHESIOLOGY

## 2025-05-08 PROCEDURE — 250N000025 HC SEVOFLURANE, PER MIN: Performed by: SPECIALIST

## 2025-05-08 PROCEDURE — 96375 TX/PRO/DX INJ NEW DRUG ADDON: CPT

## 2025-05-08 PROCEDURE — 360N000076 HC SURGERY LEVEL 3, PER MIN: Performed by: SPECIALIST

## 2025-05-08 PROCEDURE — 370N000017 HC ANESTHESIA TECHNICAL FEE, PER MIN: Performed by: SPECIALIST

## 2025-05-08 PROCEDURE — 83690 ASSAY OF LIPASE: CPT | Performed by: STUDENT IN AN ORGANIZED HEALTH CARE EDUCATION/TRAINING PROGRAM

## 2025-05-08 PROCEDURE — 96365 THER/PROPH/DIAG IV INF INIT: CPT

## 2025-05-08 PROCEDURE — 99207 PR APP CREDIT; MD BILLING SHARED VISIT: CPT

## 2025-05-08 PROCEDURE — 710N000010 HC RECOVERY PHASE 1, LEVEL 2, PER MIN: Performed by: SPECIALIST

## 2025-05-08 PROCEDURE — 88304 TISSUE EXAM BY PATHOLOGIST: CPT | Mod: TC | Performed by: SPECIALIST

## 2025-05-08 PROCEDURE — 82565 ASSAY OF CREATININE: CPT | Performed by: STUDENT IN AN ORGANIZED HEALTH CARE EDUCATION/TRAINING PROGRAM

## 2025-05-08 PROCEDURE — 85025 COMPLETE CBC W/AUTO DIFF WBC: CPT | Performed by: STUDENT IN AN ORGANIZED HEALTH CARE EDUCATION/TRAINING PROGRAM

## 2025-05-08 PROCEDURE — 99204 OFFICE O/P NEW MOD 45 MIN: CPT | Mod: FS | Performed by: SPECIALIST

## 2025-05-08 PROCEDURE — 96361 HYDRATE IV INFUSION ADD-ON: CPT

## 2025-05-08 PROCEDURE — 99223 1ST HOSP IP/OBS HIGH 75: CPT | Performed by: HOSPITALIST

## 2025-05-08 PROCEDURE — 84450 TRANSFERASE (AST) (SGOT): CPT | Performed by: STUDENT IN AN ORGANIZED HEALTH CARE EDUCATION/TRAINING PROGRAM

## 2025-05-08 PROCEDURE — 99285 EMERGENCY DEPT VISIT HI MDM: CPT | Mod: 25

## 2025-05-08 PROCEDURE — 250N000011 HC RX IP 250 OP 636: Performed by: STUDENT IN AN ORGANIZED HEALTH CARE EDUCATION/TRAINING PROGRAM

## 2025-05-08 PROCEDURE — 74177 CT ABD & PELVIS W/CONTRAST: CPT

## 2025-05-08 PROCEDURE — G0378 HOSPITAL OBSERVATION PER HR: HCPCS

## 2025-05-08 PROCEDURE — 88304 TISSUE EXAM BY PATHOLOGIST: CPT | Mod: 26 | Performed by: PATHOLOGY

## 2025-05-08 PROCEDURE — 96376 TX/PRO/DX INJ SAME DRUG ADON: CPT | Mod: 59

## 2025-05-08 PROCEDURE — 47562 LAPAROSCOPIC CHOLECYSTECTOMY: CPT | Mod: AS

## 2025-05-08 PROCEDURE — 258N000003 HC RX IP 258 OP 636: Performed by: STUDENT IN AN ORGANIZED HEALTH CARE EDUCATION/TRAINING PROGRAM

## 2025-05-08 PROCEDURE — 250N000011 HC RX IP 250 OP 636: Performed by: ANESTHESIOLOGY

## 2025-05-08 PROCEDURE — 250N000011 HC RX IP 250 OP 636: Performed by: NURSE ANESTHETIST, CERTIFIED REGISTERED

## 2025-05-08 PROCEDURE — 272N000001 HC OR GENERAL SUPPLY STERILE: Performed by: SPECIALIST

## 2025-05-08 PROCEDURE — 250N000009 HC RX 250: Performed by: ANESTHESIOLOGY

## 2025-05-08 PROCEDURE — 250N000013 HC RX MED GY IP 250 OP 250 PS 637: Performed by: STUDENT IN AN ORGANIZED HEALTH CARE EDUCATION/TRAINING PROGRAM

## 2025-05-08 PROCEDURE — 999N000141 HC STATISTIC PRE-PROCEDURE NURSING ASSESSMENT: Performed by: SPECIALIST

## 2025-05-08 PROCEDURE — 96376 TX/PRO/DX INJ SAME DRUG ADON: CPT

## 2025-05-08 PROCEDURE — 76705 ECHO EXAM OF ABDOMEN: CPT

## 2025-05-08 PROCEDURE — 250N000013 HC RX MED GY IP 250 OP 250 PS 637: Performed by: SPECIALIST

## 2025-05-08 PROCEDURE — 47562 LAPAROSCOPIC CHOLECYSTECTOMY: CPT | Performed by: SPECIALIST

## 2025-05-08 RX ORDER — OXYCODONE HYDROCHLORIDE 5 MG/1
5 TABLET ORAL
Refills: 0 | OUTPATIENT
Start: 2025-05-08

## 2025-05-08 RX ORDER — NALOXONE HYDROCHLORIDE 0.4 MG/ML
0.2 INJECTION, SOLUTION INTRAMUSCULAR; INTRAVENOUS; SUBCUTANEOUS
Status: ACTIVE | OUTPATIENT
Start: 2025-05-08

## 2025-05-08 RX ORDER — BISACODYL 10 MG
10 SUPPOSITORY, RECTAL RECTAL DAILY PRN
Status: ACTIVE | OUTPATIENT
Start: 2025-05-11

## 2025-05-08 RX ORDER — ONDANSETRON 2 MG/ML
4 INJECTION INTRAMUSCULAR; INTRAVENOUS EVERY 30 MIN PRN
Status: DISCONTINUED | OUTPATIENT
Start: 2025-05-08 | End: 2025-05-08

## 2025-05-08 RX ORDER — PROPOFOL 10 MG/ML
INJECTION, EMULSION INTRAVENOUS PRN
Status: DISCONTINUED | OUTPATIENT
Start: 2025-05-08 | End: 2025-05-08

## 2025-05-08 RX ORDER — HYDROMORPHONE HCL IN WATER/PF 6 MG/30 ML
0.2 PATIENT CONTROLLED ANALGESIA SYRINGE INTRAVENOUS EVERY 5 MIN PRN
Status: DISCONTINUED | OUTPATIENT
Start: 2025-05-08 | End: 2025-05-08 | Stop reason: HOSPADM

## 2025-05-08 RX ORDER — ONDANSETRON 2 MG/ML
4 INJECTION INTRAMUSCULAR; INTRAVENOUS ONCE
Status: COMPLETED | OUTPATIENT
Start: 2025-05-08 | End: 2025-05-08

## 2025-05-08 RX ORDER — BUPIVACAINE HYDROCHLORIDE 2.5 MG/ML
INJECTION, SOLUTION INFILTRATION; PERINEURAL PRN
Status: DISCONTINUED | OUTPATIENT
Start: 2025-05-08 | End: 2025-05-08 | Stop reason: HOSPADM

## 2025-05-08 RX ORDER — ACETAMINOPHEN 325 MG/1
975 TABLET ORAL EVERY 8 HOURS
Status: DISPENSED | OUTPATIENT
Start: 2025-05-08

## 2025-05-08 RX ORDER — ONDANSETRON 4 MG/1
4 TABLET, ORALLY DISINTEGRATING ORAL EVERY 30 MIN PRN
OUTPATIENT
Start: 2025-05-08

## 2025-05-08 RX ORDER — SODIUM CHLORIDE, SODIUM LACTATE, POTASSIUM CHLORIDE, CALCIUM CHLORIDE 600; 310; 30; 20 MG/100ML; MG/100ML; MG/100ML; MG/100ML
INJECTION, SOLUTION INTRAVENOUS CONTINUOUS
Status: DISCONTINUED | OUTPATIENT
Start: 2025-05-08 | End: 2025-05-08 | Stop reason: HOSPADM

## 2025-05-08 RX ORDER — NALOXONE HYDROCHLORIDE 0.4 MG/ML
0.1 INJECTION, SOLUTION INTRAMUSCULAR; INTRAVENOUS; SUBCUTANEOUS
OUTPATIENT
Start: 2025-05-08

## 2025-05-08 RX ORDER — MAGNESIUM HYDROXIDE/ALUMINUM HYDROXICE/SIMETHICONE 120; 1200; 1200 MG/30ML; MG/30ML; MG/30ML
15 SUSPENSION ORAL ONCE
Status: COMPLETED | OUTPATIENT
Start: 2025-05-08 | End: 2025-05-08

## 2025-05-08 RX ORDER — SODIUM CHLORIDE, SODIUM LACTATE, POTASSIUM CHLORIDE, AND CALCIUM CHLORIDE .6; .31; .03; .02 G/100ML; G/100ML; G/100ML; G/100ML
IRRIGANT IRRIGATION PRN
Status: DISCONTINUED | OUTPATIENT
Start: 2025-05-08 | End: 2025-05-08 | Stop reason: HOSPADM

## 2025-05-08 RX ORDER — DEXAMETHASONE SODIUM PHOSPHATE 4 MG/ML
4 INJECTION, SOLUTION INTRA-ARTICULAR; INTRALESIONAL; INTRAMUSCULAR; INTRAVENOUS; SOFT TISSUE
Status: DISCONTINUED | OUTPATIENT
Start: 2025-05-08 | End: 2025-05-08 | Stop reason: HOSPADM

## 2025-05-08 RX ORDER — FENTANYL CITRATE 50 UG/ML
50 INJECTION, SOLUTION INTRAMUSCULAR; INTRAVENOUS EVERY 5 MIN PRN
Status: DISCONTINUED | OUTPATIENT
Start: 2025-05-08 | End: 2025-05-08 | Stop reason: HOSPADM

## 2025-05-08 RX ORDER — DEXAMETHASONE SODIUM PHOSPHATE 10 MG/ML
4 INJECTION, SOLUTION INTRAMUSCULAR; INTRAVENOUS
OUTPATIENT
Start: 2025-05-08

## 2025-05-08 RX ORDER — ONDANSETRON 2 MG/ML
4 INJECTION INTRAMUSCULAR; INTRAVENOUS EVERY 30 MIN PRN
Status: DISCONTINUED | OUTPATIENT
Start: 2025-05-08 | End: 2025-05-08 | Stop reason: HOSPADM

## 2025-05-08 RX ORDER — NALOXONE HYDROCHLORIDE 0.4 MG/ML
0.1 INJECTION, SOLUTION INTRAMUSCULAR; INTRAVENOUS; SUBCUTANEOUS
Status: DISCONTINUED | OUTPATIENT
Start: 2025-05-08 | End: 2025-05-08 | Stop reason: HOSPADM

## 2025-05-08 RX ORDER — AMOXICILLIN 250 MG
2 CAPSULE ORAL 2 TIMES DAILY PRN
Status: ACTIVE | OUTPATIENT
Start: 2025-05-08

## 2025-05-08 RX ORDER — HYDROMORPHONE HYDROCHLORIDE 1 MG/ML
0.5 INJECTION, SOLUTION INTRAMUSCULAR; INTRAVENOUS; SUBCUTANEOUS EVERY 30 MIN PRN
Refills: 0 | Status: COMPLETED | OUTPATIENT
Start: 2025-05-08 | End: 2025-05-08

## 2025-05-08 RX ORDER — FENTANYL CITRATE 50 UG/ML
INJECTION, SOLUTION INTRAMUSCULAR; INTRAVENOUS PRN
Status: DISCONTINUED | OUTPATIENT
Start: 2025-05-08 | End: 2025-05-08

## 2025-05-08 RX ORDER — DEXAMETHASONE SODIUM PHOSPHATE 10 MG/ML
INJECTION, SOLUTION INTRAMUSCULAR; INTRAVENOUS PRN
Status: DISCONTINUED | OUTPATIENT
Start: 2025-05-08 | End: 2025-05-08

## 2025-05-08 RX ORDER — POLYETHYLENE GLYCOL 3350 17 G/17G
17 POWDER, FOR SOLUTION ORAL DAILY
Status: ACTIVE | OUTPATIENT
Start: 2025-05-09

## 2025-05-08 RX ORDER — PROPOFOL 10 MG/ML
INJECTION, EMULSION INTRAVENOUS CONTINUOUS PRN
Status: DISCONTINUED | OUTPATIENT
Start: 2025-05-08 | End: 2025-05-08

## 2025-05-08 RX ORDER — SODIUM CHLORIDE 9 MG/ML
INJECTION, SOLUTION INTRAVENOUS CONTINUOUS
Status: ACTIVE | OUTPATIENT
Start: 2025-05-08

## 2025-05-08 RX ORDER — LIDOCAINE 40 MG/G
CREAM TOPICAL
Status: ACTIVE | OUTPATIENT
Start: 2025-05-08

## 2025-05-08 RX ORDER — HYDROMORPHONE HCL IN WATER/PF 6 MG/30 ML
0.2 PATIENT CONTROLLED ANALGESIA SYRINGE INTRAVENOUS
Status: DISPENSED | OUTPATIENT
Start: 2025-05-08

## 2025-05-08 RX ORDER — ONDANSETRON 2 MG/ML
4 INJECTION INTRAMUSCULAR; INTRAVENOUS EVERY 30 MIN PRN
OUTPATIENT
Start: 2025-05-08

## 2025-05-08 RX ORDER — ONDANSETRON 4 MG/1
4 TABLET, ORALLY DISINTEGRATING ORAL EVERY 6 HOURS PRN
Status: ACTIVE | OUTPATIENT
Start: 2025-05-08

## 2025-05-08 RX ORDER — IOPAMIDOL 755 MG/ML
90 INJECTION, SOLUTION INTRAVASCULAR ONCE
Status: COMPLETED | OUTPATIENT
Start: 2025-05-08 | End: 2025-05-08

## 2025-05-08 RX ORDER — PIPERACILLIN SODIUM, TAZOBACTAM SODIUM 3; .375 G/15ML; G/15ML
3.38 INJECTION, POWDER, LYOPHILIZED, FOR SOLUTION INTRAVENOUS EVERY 6 HOURS
Status: DISPENSED | OUTPATIENT
Start: 2025-05-08

## 2025-05-08 RX ORDER — CEFAZOLIN SODIUM/WATER 2 G/20 ML
2 SYRINGE (ML) INTRAVENOUS
Status: COMPLETED | OUTPATIENT
Start: 2025-05-08 | End: 2025-05-08

## 2025-05-08 RX ORDER — ONDANSETRON 2 MG/ML
INJECTION INTRAMUSCULAR; INTRAVENOUS PRN
Status: DISCONTINUED | OUTPATIENT
Start: 2025-05-08 | End: 2025-05-08

## 2025-05-08 RX ORDER — ONDANSETRON 4 MG/1
4 TABLET, ORALLY DISINTEGRATING ORAL EVERY 30 MIN PRN
Status: DISCONTINUED | OUTPATIENT
Start: 2025-05-08 | End: 2025-05-08 | Stop reason: HOSPADM

## 2025-05-08 RX ORDER — OXYCODONE HYDROCHLORIDE 5 MG/1
10 TABLET ORAL
Refills: 0 | OUTPATIENT
Start: 2025-05-08

## 2025-05-08 RX ORDER — NALOXONE HYDROCHLORIDE 0.4 MG/ML
0.4 INJECTION, SOLUTION INTRAMUSCULAR; INTRAVENOUS; SUBCUTANEOUS
Status: ACTIVE | OUTPATIENT
Start: 2025-05-08

## 2025-05-08 RX ORDER — ONDANSETRON 2 MG/ML
4 INJECTION INTRAMUSCULAR; INTRAVENOUS EVERY 6 HOURS PRN
Status: DISPENSED | OUTPATIENT
Start: 2025-05-08

## 2025-05-08 RX ORDER — AMOXICILLIN 250 MG
1 CAPSULE ORAL 2 TIMES DAILY PRN
Status: ACTIVE | OUTPATIENT
Start: 2025-05-08

## 2025-05-08 RX ORDER — HYDROMORPHONE HCL IN WATER/PF 6 MG/30 ML
0.4 PATIENT CONTROLLED ANALGESIA SYRINGE INTRAVENOUS EVERY 5 MIN PRN
Status: DISCONTINUED | OUTPATIENT
Start: 2025-05-08 | End: 2025-05-08 | Stop reason: HOSPADM

## 2025-05-08 RX ORDER — ACETAMINOPHEN 325 MG/1
975 TABLET ORAL ONCE
Status: COMPLETED | OUTPATIENT
Start: 2025-05-08 | End: 2025-05-08

## 2025-05-08 RX ORDER — FENTANYL CITRATE 50 UG/ML
25 INJECTION, SOLUTION INTRAMUSCULAR; INTRAVENOUS EVERY 5 MIN PRN
Status: DISCONTINUED | OUTPATIENT
Start: 2025-05-08 | End: 2025-05-08 | Stop reason: HOSPADM

## 2025-05-08 RX ORDER — LIDOCAINE HYDROCHLORIDE 10 MG/ML
INJECTION, SOLUTION INFILTRATION; PERINEURAL PRN
Status: DISCONTINUED | OUTPATIENT
Start: 2025-05-08 | End: 2025-05-08

## 2025-05-08 RX ORDER — AMOXICILLIN 250 MG
1 CAPSULE ORAL 2 TIMES DAILY
Status: DISPENSED | OUTPATIENT
Start: 2025-05-08

## 2025-05-08 RX ORDER — LIDOCAINE 40 MG/G
CREAM TOPICAL
Status: DISCONTINUED | OUTPATIENT
Start: 2025-05-08 | End: 2025-05-08 | Stop reason: HOSPADM

## 2025-05-08 RX ORDER — HYDROCODONE BITARTRATE AND ACETAMINOPHEN 5; 325 MG/1; MG/1
1 TABLET ORAL EVERY 6 HOURS PRN
Status: DISPENSED | OUTPATIENT
Start: 2025-05-08

## 2025-05-08 RX ORDER — HYDROMORPHONE HCL IN WATER/PF 6 MG/30 ML
0.4 PATIENT CONTROLLED ANALGESIA SYRINGE INTRAVENOUS
Status: DISPENSED | OUTPATIENT
Start: 2025-05-08

## 2025-05-08 RX ORDER — PIPERACILLIN SODIUM, TAZOBACTAM SODIUM 4; .5 G/20ML; G/20ML
4.5 INJECTION, POWDER, LYOPHILIZED, FOR SOLUTION INTRAVENOUS ONCE
Status: COMPLETED | OUTPATIENT
Start: 2025-05-08 | End: 2025-05-08

## 2025-05-08 RX ORDER — CEFAZOLIN SODIUM/WATER 2 G/20 ML
2 SYRINGE (ML) INTRAVENOUS SEE ADMIN INSTRUCTIONS
Status: DISCONTINUED | OUTPATIENT
Start: 2025-05-08 | End: 2025-05-08 | Stop reason: HOSPADM

## 2025-05-08 RX ORDER — KETOROLAC TROMETHAMINE 30 MG/ML
INJECTION, SOLUTION INTRAMUSCULAR; INTRAVENOUS PRN
Status: DISCONTINUED | OUTPATIENT
Start: 2025-05-08 | End: 2025-05-08

## 2025-05-08 RX ADMIN — FENTANYL CITRATE 50 MCG: 50 INJECTION INTRAMUSCULAR; INTRAVENOUS at 17:13

## 2025-05-08 RX ADMIN — FENTANYL CITRATE 100 MCG: 50 INJECTION INTRAMUSCULAR; INTRAVENOUS at 15:01

## 2025-05-08 RX ADMIN — SODIUM CHLORIDE, SODIUM LACTATE, POTASSIUM CHLORIDE, AND CALCIUM CHLORIDE: .6; .31; .03; .02 INJECTION, SOLUTION INTRAVENOUS at 15:51

## 2025-05-08 RX ADMIN — HYDROMORPHONE HYDROCHLORIDE 0.2 MG: 0.2 INJECTION, SOLUTION INTRAMUSCULAR; INTRAVENOUS; SUBCUTANEOUS at 19:52

## 2025-05-08 RX ADMIN — SODIUM CHLORIDE 1000 ML: 0.9 INJECTION, SOLUTION INTRAVENOUS at 06:15

## 2025-05-08 RX ADMIN — KETOROLAC TROMETHAMINE 15 MG: 30 INJECTION, SOLUTION INTRAMUSCULAR at 16:04

## 2025-05-08 RX ADMIN — ONDANSETRON 4 MG: 2 INJECTION, SOLUTION INTRAMUSCULAR; INTRAVENOUS at 21:21

## 2025-05-08 RX ADMIN — PROPOFOL 150 MCG/KG/MIN: 10 INJECTION, EMULSION INTRAVENOUS at 15:04

## 2025-05-08 RX ADMIN — HYDROMORPHONE HYDROCHLORIDE 0.5 MG: 1 INJECTION, SOLUTION INTRAMUSCULAR; INTRAVENOUS; SUBCUTANEOUS at 09:21

## 2025-05-08 RX ADMIN — DEXMEDETOMIDINE HYDROCHLORIDE 12 MCG: 100 INJECTION, SOLUTION INTRAVENOUS at 15:33

## 2025-05-08 RX ADMIN — DEXAMETHASONE SODIUM PHOSPHATE 10 MG: 10 INJECTION, SOLUTION INTRAMUSCULAR; INTRAVENOUS at 15:21

## 2025-05-08 RX ADMIN — MIDAZOLAM 2 MG: 1 INJECTION INTRAMUSCULAR; INTRAVENOUS at 14:51

## 2025-05-08 RX ADMIN — PROPOFOL 300 MG: 10 INJECTION, EMULSION INTRAVENOUS at 15:04

## 2025-05-08 RX ADMIN — PIPERACILLIN AND TAZOBACTAM 3.38 G: 3; .375 INJECTION, POWDER, FOR SOLUTION INTRAVENOUS at 21:22

## 2025-05-08 RX ADMIN — SODIUM CHLORIDE: 0.9 INJECTION, SOLUTION INTRAVENOUS at 10:12

## 2025-05-08 RX ADMIN — ONDANSETRON 4 MG: 2 INJECTION, SOLUTION INTRAMUSCULAR; INTRAVENOUS at 06:16

## 2025-05-08 RX ADMIN — Medication 200 MG: at 16:17

## 2025-05-08 RX ADMIN — LIDOCAINE HYDROCHLORIDE 5 ML: 10 INJECTION, SOLUTION INFILTRATION; PERINEURAL at 15:04

## 2025-05-08 RX ADMIN — ALUMINUM HYDROXIDE, MAGNESIUM HYDROXIDE, AND DIMETHICONE 15 ML: 200; 20; 200 SUSPENSION ORAL at 06:50

## 2025-05-08 RX ADMIN — HYDROMORPHONE HYDROCHLORIDE 0.4 MG: 0.2 INJECTION, SOLUTION INTRAMUSCULAR; INTRAVENOUS; SUBCUTANEOUS at 22:01

## 2025-05-08 RX ADMIN — ROCURONIUM BROMIDE 50 MG: 10 INJECTION, SOLUTION INTRAVENOUS at 15:04

## 2025-05-08 RX ADMIN — ACETAMINOPHEN 975 MG: 325 TABLET, FILM COATED ORAL at 21:21

## 2025-05-08 RX ADMIN — SODIUM CHLORIDE: 0.9 INJECTION, SOLUTION INTRAVENOUS at 18:34

## 2025-05-08 RX ADMIN — SENNOSIDES AND DOCUSATE SODIUM 1 TABLET: 50; 8.6 TABLET ORAL at 21:21

## 2025-05-08 RX ADMIN — FENTANYL CITRATE 50 MCG: 50 INJECTION INTRAMUSCULAR; INTRAVENOUS at 15:38

## 2025-05-08 RX ADMIN — ACETAMINOPHEN 975 MG: 325 TABLET ORAL at 13:58

## 2025-05-08 RX ADMIN — IOPAMIDOL 90 ML: 755 INJECTION, SOLUTION INTRAVENOUS at 08:11

## 2025-05-08 RX ADMIN — HYDROMORPHONE HYDROCHLORIDE 0.5 MG: 1 INJECTION, SOLUTION INTRAMUSCULAR; INTRAVENOUS; SUBCUTANEOUS at 06:17

## 2025-05-08 RX ADMIN — HYDROCODONE BITARTRATE AND ACETAMINOPHEN 1 TABLET: 5; 325 TABLET ORAL at 18:33

## 2025-05-08 RX ADMIN — PIPERACILLIN AND TAZOBACTAM 4.5 G: 4; .5 INJECTION, POWDER, FOR SOLUTION INTRAVENOUS at 09:21

## 2025-05-08 RX ADMIN — ONDANSETRON 4 MG: 2 INJECTION INTRAMUSCULAR; INTRAVENOUS at 16:03

## 2025-05-08 RX ADMIN — ONDANSETRON 4 MG: 2 INJECTION, SOLUTION INTRAMUSCULAR; INTRAVENOUS at 09:21

## 2025-05-08 RX ADMIN — HYDROMORPHONE HYDROCHLORIDE 0.5 MG: 1 INJECTION, SOLUTION INTRAMUSCULAR; INTRAVENOUS; SUBCUTANEOUS at 11:15

## 2025-05-08 RX ADMIN — ONDANSETRON 4 MG: 2 INJECTION, SOLUTION INTRAMUSCULAR; INTRAVENOUS at 17:20

## 2025-05-08 RX ADMIN — SODIUM CHLORIDE, SODIUM LACTATE, POTASSIUM CHLORIDE, AND CALCIUM CHLORIDE: .6; .31; .03; .02 INJECTION, SOLUTION INTRAVENOUS at 14:00

## 2025-05-08 RX ADMIN — Medication 2 G: at 14:51

## 2025-05-08 RX ADMIN — HYDROMORPHONE HYDROCHLORIDE 1 MG: 1 INJECTION, SOLUTION INTRAMUSCULAR; INTRAVENOUS; SUBCUTANEOUS at 15:25

## 2025-05-08 RX ADMIN — DEXMEDETOMIDINE HYDROCHLORIDE 12 MCG: 100 INJECTION, SOLUTION INTRAVENOUS at 15:19

## 2025-05-08 RX ADMIN — FENTANYL CITRATE 50 MCG: 50 INJECTION INTRAMUSCULAR; INTRAVENOUS at 15:47

## 2025-05-08 ASSESSMENT — ACTIVITIES OF DAILY LIVING (ADL)
ADLS_ACUITY_SCORE: 51
ADLS_ACUITY_SCORE: 45
ADLS_ACUITY_SCORE: 34
ADLS_ACUITY_SCORE: 51
ADLS_ACUITY_SCORE: 37
ADLS_ACUITY_SCORE: 51
ADLS_ACUITY_SCORE: 43
ADLS_ACUITY_SCORE: 51
ADLS_ACUITY_SCORE: 31
ADLS_ACUITY_SCORE: 33
ADLS_ACUITY_SCORE: 43
ADLS_ACUITY_SCORE: 45
ADLS_ACUITY_SCORE: 31
ADLS_ACUITY_SCORE: 31

## 2025-05-08 NOTE — ED PROVIDER NOTES
EMERGENCY DEPARTMENT ENCOUNTER      NAME: Tylan D Severson  AGE: 24 year old male  YOB: 2000  MRN: [id number]  EVALUATION DATE & TIME: 5/8/2025  6:02 AM    PCP: No Ref-Primary, Physician    ED PROVIDER: Boogie Stevens MD      Chief Complaint   Patient presents with    Abdominal Pain    Back Pain         FINAL IMPRESSION:  1. Acute cholecystitis          ED COURSE & MEDICAL DECISION MAKING:    Pertinent Labs & Imaging studies reviewed. (See chart for details)  24 year old male presents to the Emergency Department for evaluation of abdominal pain    ED Course as of 05/08/25 0927   Thu May 08, 2025   0614 Patient is a 24-year-old male with history of pancreatitis and cholelithiasis presenting to the emergency department for evaluation of abdominal pain.  Pain started early this morning and has been persistent since.  Mostly located in the upper abdomen particularly in the left upper quadrant and epigastrium, has had some nausea and vomiting as well.  No fevers.  Denies any diarrhea.  No urinary symptoms.  No prior abdominal surgeries    On exam patient is quite uncomfortable and tearful.  Has pain reproducible in the epigastrium and left upper quadrant but no guarding or rebound.  Negative Hernandez's.  Lower abdomen is soft and nontender.  Heart borderline tachycardic but regular rhythm.    Initial differential includes is not limited to pancreatitis, cholecystitis, gastritis.  Will obtain blood work and right upper quadrant ultrasound as well as give IV analgesia and antiemetics.   0642 Labs reviewed interpreted myself.  CBC shows a leukocytosis of 13.2.  Slight anion gap with a bicarb of 18 but essentially normal glucose.  Very slight elevation of AST but normal bilirubin, normal lipase.   0813 Right upper quadrant ultrasound redemonstrates biliary sludge and cholelithiasis but no clear signs of acute cholecystitis and a negative sonographic Hernandez's.    Upon repeat evaluation patient had some improvement  in his pain but still rates pain about a 5-6 out of 10 with ongoing reproducible tenderness in the epigastrium, left upper quadrant and some right upper quadrant as well as some voluntary guarding.  No clear explanation for his abdominal pain thus far.  Presentation is most consistent with pancreatitis and he does endorse drinking alcohol last night prior to onset of symptoms however lipase is normal.  As possible lipase has not had time to increase yet or perhaps will have reduced lipase elevation from recurrent pancreatitis but given persistent abdominal pain will obtain CT today to evaluate for other acute intra-abdominal process   0837 CT scan suggest possible acute cholecystitis without any signs of perforated viscus or other acute intra-abdominal process.  Will discuss with surgery given conflicting results of CT scan right upper quadrant ultrasound however patient does seem to have pain localizing more to the right upper quadrant on exam.   0910 Discussed with surgery no  OR times currently available today.  At this point I may recommend admission and will plan to intervene tomorrow.  Will start empiric antibiotics in the meantime.  Plan for admission will make n.p.o. currently in case OR time opens up later today     I discussed with hospitalist plan for admission at this point in time.  Will start with observation admit as patient can likely go home after surgery      6:12 AM I met and evaluated the patient.         Medical Decision Making    Admit.    MIPS (CTPE, Dental pain, Hernandez, Sinusitis, Asthma/COPD, Head Trauma): Not Applicable    SEPSIS: None           At the conclusion of the encounter I discussed the results of all of the tests and the disposition. The questions were answered. The patient or family acknowledged understanding and was agreeable with the care plan.     0 minutes of critical care time     MEDICATIONS GIVEN IN THE EMERGENCY:  Medications   HYDROmorphone (PF) (DILAUDID) injection 0.5  "mg (0.5 mg Intravenous $Given 5/8/25 0921)   ondansetron (ZOFRAN) injection 4 mg (4 mg Intravenous $Given 5/8/25 0616)   piperacillin-tazobactam (ZOSYN) 4.5 g vial to attach to  mL bag (4.5 g Intravenous $New Bag 5/8/25 0921)   sodium chloride 0.9 % infusion (has no administration in time range)   sodium chloride 0.9% BOLUS 1,000 mL (0 mLs Intravenous Stopped 5/8/25 0755)   alum & mag hydroxide-simethicone (MAALOX) suspension 15 mL (15 mLs Oral $Given 5/8/25 0650)   iopamidol (ISOVUE-370) solution 90 mL (90 mLs Intravenous $Given 5/8/25 0811)   ondansetron (ZOFRAN) injection 4 mg (4 mg Intravenous $Given 5/8/25 0921)       NEW PRESCRIPTIONS STARTED AT TODAY'S ER VISIT  New Prescriptions    No medications on file          =================================================================    HPI    Patient information was obtained from: patient     Use of : N/A       Deny D Severson is a 24 year old male with a pertinent history of acute pancreatitis (2/2025) who presents to this ED by walk-in for evaluation of abdominal pain.    Between 1-2:00 AM this morning (~4 hours ago) the patient developed generalized abdominal pain that radiates to his back. Now, it is worst \"under my chest\" and in his L>R epigastrium. He has had similar abdominal pain in the past but it has never been this bad. There is associated nausea and vomiting so the patient took a Zofran he had at home. He reports a history of pancreatitis in 2/2025 and was told at that time that gall stones were visualized. He was further informed that these gall stones may be painful when they came out but he has never experienced pain related to them in the past. No abdominal surgical history. No known allergies. He has not taken any pain medications today or in the last few days.     He denies diarrhea, fever, or any other complaints at this time.     REVIEW OF SYSTEMS   Refer to the HPI    PAST MEDICAL HISTORY:  History reviewed. No pertinent " "past medical history.    PAST SURGICAL HISTORY:  History reviewed. No pertinent surgical history.        CURRENT MEDICATIONS:    FIBER ADULT GUMMIES PO        ALLERGIES:  No Known Allergies    FAMILY HISTORY:  History reviewed. No pertinent family history.    SOCIAL HISTORY:   Social History     Socioeconomic History    Marital status: Single     Social Drivers of Health     Financial Resource Strain: Low Risk  (2/20/2025)    Financial Resource Strain     Within the past 12 months, have you or your family members you live with been unable to get utilities (heat, electricity) when it was really needed?: No   Food Insecurity: Low Risk  (2/20/2025)    Food Insecurity     Within the past 12 months, did you worry that your food would run out before you got money to buy more?: No     Within the past 12 months, did the food you bought just not last and you didn t have money to get more?: No   Transportation Needs: Low Risk  (2/20/2025)    Transportation Needs     Within the past 12 months, has lack of transportation kept you from medical appointments, getting your medicines, non-medical meetings or appointments, work, or from getting things that you need?: No    Received from [facility] [employer] [facility]    Social Connections   Interpersonal Safety: Low Risk  (2/20/2025)    Interpersonal Safety     Do you feel physically and emotionally safe where you currently live?: Yes     Within the past 12 months, have you been hit, slapped, kicked or otherwise physically hurt by someone?: No     Within the past 12 months, have you been humiliated or emotionally abused in other ways by your partner or ex-partner?: No   Housing Stability: Low Risk  (2/20/2025)    Housing Stability     Do you have housing? : Yes     Are you worried about losing your housing?: No       VITALS:  BP (!) 159/88   Pulse 85   Temp 97.6  F (36.4  C) (Oral)   Resp 16   Ht 1.854 m (6' 1\")   Wt 92.6 kg (204 lb 1.6 oz)   SpO2 100%   BMI " 26.93 kg/m      PHYSICAL EXAM    Constitutional: Well developed, Well nourished, NAD,   HENT: Normocephalic, Atraumatic,  mucous membranes moist,   Neck- trachea midline, No stridor.    Eyes:EOMI, Conjunctiva normal, No discharge.   Respiratory: Normal breath sounds, No respiratory distress, No wheezing.    Cardiovascular: Normal heart rate, Regular rhythm, No murmurs,   Abdominal: Soft, No rebound or guarding.   Epigastric and LLQ abdominal pain. No Hernandez's sign.  Musculoskeletal: no deformity or malalignment   Integument: Warm, Dry, No erythema,    Neurologic: Alert & oriented x 3   Psychiatric: Affect normal, Cooperative.      LAB:  All pertinent labs reviewed and interpreted.  Results for orders placed or performed during the hospital encounter of 05/08/25   US Abdomen Limited    Impression    IMPRESSION:  1.  Cholelithiasis and biliary sludge without convincing evidence of acute cholecystitis. If concern for cholecystitis persists a HIDA scan should be considered.  2.  Mild hepatic steatosis.   CT Abdomen Pelvis w Contrast    Impression    IMPRESSION:     1.  Cholelithiasis with mild pericholecystic edema, suggesting acute cholecystitis.    2.  No significant free fluid. No free air. No abscess.    3.  No other findings to explain symptoms.     Extra Green Top (Lithium Heparin) Tube   Result Value Ref Range    Hold Specimen JIC    Extra Purple Top Tube   Result Value Ref Range    Hold Specimen JIC    Basic metabolic panel   Result Value Ref Range    Sodium 141 135 - 145 mmol/L    Potassium 4.2 3.4 - 5.3 mmol/L    Chloride 103 98 - 107 mmol/L    Carbon Dioxide (CO2) 18 (L) 22 - 29 mmol/L    Anion Gap 20 (H) 7 - 15 mmol/L    Urea Nitrogen 6.7 6.0 - 20.0 mg/dL    Creatinine 0.76 0.67 - 1.17 mg/dL    GFR Estimate >90 >60 mL/min/1.73m2    Calcium 8.9 8.8 - 10.4 mg/dL    Glucose 112 (H) 70 - 99 mg/dL   Result Value Ref Range    Lipase 38 13 - 60 U/L   Hepatic function panel   Result Value Ref Range    Protein Total  8.2 6.4 - 8.3 g/dL    Albumin 4.8 3.5 - 5.2 g/dL    Bilirubin Total 0.3 <=1.2 mg/dL    Alkaline Phosphatase 105 40 - 150 U/L    AST 47 (H) 0 - 45 U/L    ALT 68 0 - 70 U/L    Bilirubin Direct 0.12 0.00 - 0.30 mg/dL   CBC with platelets and differential   Result Value Ref Range    WBC Count 13.2 (H) 4.0 - 11.0 10e3/uL    RBC Count 5.44 4.40 - 5.90 10e6/uL    Hemoglobin 15.6 13.3 - 17.7 g/dL    Hematocrit 44.2 40.0 - 53.0 %    MCV 81 78 - 100 fL    MCH 28.7 26.5 - 33.0 pg    MCHC 35.3 31.5 - 36.5 g/dL    RDW 12.9 10.0 - 15.0 %    Platelet Count 315 150 - 450 10e3/uL    % Neutrophils 76 %    % Lymphocytes 18 %    % Monocytes 5 %    % Eosinophils 0 %    % Basophils 0 %    % Immature Granulocytes 1 %    NRBCs per 100 WBC 0 <1 /100    Absolute Neutrophils 10.0 (H) 1.6 - 8.3 10e3/uL    Absolute Lymphocytes 2.4 0.8 - 5.3 10e3/uL    Absolute Monocytes 0.6 0.0 - 1.3 10e3/uL    Absolute Eosinophils 0.1 0.0 - 0.7 10e3/uL    Absolute Basophils 0.0 0.0 - 0.2 10e3/uL    Absolute Immature Granulocytes 0.1 <=0.4 10e3/uL    Absolute NRBCs 0.0 10e3/uL       RADIOLOGY:  Reviewed all pertinent imaging. Please see official radiology report.  CT Abdomen Pelvis w Contrast   Final Result   IMPRESSION:       1.  Cholelithiasis with mild pericholecystic edema, suggesting acute cholecystitis.      2.  No significant free fluid. No free air. No abscess.      3.  No other findings to explain symptoms.         US Abdomen Limited   Final Result   IMPRESSION:   1.  Cholelithiasis and biliary sludge without convincing evidence of acute cholecystitis. If concern for cholecystitis persists a HIDA scan should be considered.   2.  Mild hepatic steatosis.          EKG:        PROCEDURES:   None      [facility] Documentation:   CMS Diagnoses:              Jv CLEVELAND, am serving as a scribe to document services personally performed by Boogie Stevens MD based on my observation and the provider's statements to me. I, Boogie Stevens MD, attest  that Jv Samule is acting in a scribe capacity, has observed my performance of the services and has documented them in accordance with my direction.    Boogie Stevens MD  [facility] EMERGENCY ROOM  [address] [facility] [id number]  [phone]      Boogie Stevens MD  05/08/25 0911

## 2025-05-08 NOTE — ED NOTES
Introduced self to patient and family. Whiteboard updated. Bed is locked and in low position. Updated the pt with the plan of care. Pain reassessed- pain and nausea medicine administered. Call light within reach.     NS infusing, marked ready for US.

## 2025-05-08 NOTE — ANESTHESIA PREPROCEDURE EVALUATION
Anesthesia Pre-Procedure Evaluation    Patient: Tylan D Severson   MRN: [id number] : 2000          Procedure : Procedure(s):  CHOLECYSTECTOMY, LAPAROSCOPIC         History reviewed. No pertinent past medical history.   History reviewed. No pertinent surgical history.   No Known Allergies   Social History     Tobacco Use    Smoking status: Never    Smokeless tobacco: Never   Substance Use Topics    Alcohol use: Not Currently      Wt Readings from Last 1 Encounters:   25 92.6 kg (204 lb 1.6 oz)        Anesthesia Evaluation   Pt has had prior anesthetic.     No history of anesthetic complications       ROS/MED HX  ENT/Pulmonary:  - neg pulmonary ROS     Neurologic:  - neg neurologic ROS     Cardiovascular:  - neg cardiovascular ROS     METS/Exercise Tolerance:     Hematologic:  - neg hematologic  ROS     Musculoskeletal:       GI/Hepatic:     (+)          cholecystitis/cholelithiasis,          Renal/Genitourinary:  - neg Renal ROS     Endo:  - neg endo ROS     Psychiatric/Substance Use:     (+)   alcohol abuse      Infectious Disease:       Malignancy:       Other:              Physical Exam  Airway  Mallampati: II  TM distance: >3 FB  Neck ROM: full  Mouth opening: >= 4 cm    Cardiovascular   Rhythm: regular  Rate: normal rate     Dental     Pulmonary Breath sounds clear to auscultation        Neurological - normal exam  He appears awake, alert and oriented x3.    Other Findings       OUTSIDE LABS:  CBC:   Lab Results   Component Value Date    WBC 13.2 (H) 2025    WBC 11.9 (H) 2025    HGB 15.6 2025    HGB 14.7 2025    HCT 44.2 2025    HCT 41.9 2025     2025     2025     BMP:   Lab Results   Component Value Date     2025     2025    POTASSIUM 4.2 2025    POTASSIUM 4.2 2025    CHLORIDE 103 2025    CHLORIDE 104 2025    CO2 18 (L) 2025    CO2 24 2025    BUN 6.7 2025    BUN 5.9 (L)  "04/18/2025    CR 0.76 05/08/2025    CR 0.75 04/18/2025     (H) 05/08/2025     (H) 04/18/2025     COAGS: No results found for: \"PTT\", \"INR\", \"FIBR\"  POC: No results found for: \"BGM\", \"HCG\", \"HCGS\"  HEPATIC:   Lab Results   Component Value Date    ALBUMIN 4.8 05/08/2025    PROTTOTAL 8.2 05/08/2025    ALT 68 05/08/2025    AST 47 (H) 05/08/2025    ALKPHOS 105 05/08/2025    BILITOTAL 0.3 05/08/2025     OTHER:   Lab Results   Component Value Date    SONYA 8.9 05/08/2025    MAG 1.7 02/18/2025    LIPASE 38 05/08/2025       Anesthesia Plan    ASA Status:  2          Induction: intravenous.   Techniques and Equipment:       - Monitoring Plan: standard ASA monitoring.     Consents    Anesthesia Plan(s) and associated risks, benefits, and realistic alternatives discussed. Questions answered and patient/representative(s) expressed understanding.     - Discussed: anesthesiologist     - Discussed with:  Patient            Postoperative Care    Pain management: non-narcotic analgesics, plan for postoperative opioid use.        Comments:                   Pancho Murillo MD    I have reviewed the pertinent notes and labs in the chart from the past 30 days and (re)examined the patient.  Any updates or changes from those notes are reflected in this note.    Clinically Significant Risk Factors Present on Admission              # Anion Gap Metabolic Acidosis: Highest Anion Gap = 20 mmol/L in last 2 days, will monitor and treat as appropriate                # Overweight: Estimated body mass index is 26.93 kg/m  as calculated from the following:    Height as of this encounter: 1.854 m (6' 1\").    Weight as of this encounter: 92.6 kg (204 lb 1.6 oz).                    "

## 2025-05-08 NOTE — ED NOTES
Checked on the pt- resting in bed. Reassessed pain and nausea.    Pain down to 4/10, hasn't had an emesis episode following 1st dose of Zofran.    Brother at bedside. Still waiting for US.

## 2025-05-08 NOTE — PHARMACY-ADMISSION MEDICATION HISTORY
Pharmacist Admission Medication History    Admission medication history is complete. The information provided in this note is only as accurate as the sources available at the time of the update.    Information Source(s): Patient and CareEverywhere/SureScripts via in-person    Pertinent Information: n/a    Changes made to PTA medication list:  Added: Fiber gummies  Deleted: oxycodone (2/20/25 #5), ondansetron   Changed: None    Allergies reviewed with patient and updates made in EHR: yes    Medication History Completed By: Elaine Lane, Kevin 5/8/2025 9:05 AM    PTA Med List   Medication Sig Last Dose/Taking    FIBER ADULT GUMMIES PO Take 1 tablet by mouth daily as needed. Past Week

## 2025-05-08 NOTE — ED TRIAGE NOTES
Pt arrives to ed with c/o of generalized abd pain and lower back pain since last night, pt also feeling nauseous.      Triage Assessment (Adult)       Row Name 05/08/25 0601          Triage Assessment    Airway WDL WDL        Respiratory WDL    Respiratory WDL WDL        Cardiac WDL    Cardiac WDL WDL        Cognitive/Neuro/Behavioral WDL    Cognitive/Neuro/Behavioral WDL WDL

## 2025-05-08 NOTE — CONSULTS
General Surgery Consultation  Tylan D Severson MRN# [id number]   Age/Sex: 24 year old male YOB: 2000     Reason for consult: 1. Acute cholecystitis            Requesting physician: -                   Assessment and Plan:   Assessment:  Tylan D Severson is a 24 year old male with a history of pancreatitis who presents with abdominal pain that started overnight. Afebrile and no tachycardia. Labs notable for leukocytosis, normal LFTs with exception of a minimally elevated AST, and normal lipase. CT scan shows gallstones with mild pericholecystic edema. Further evaluation with US shows gallstones and biliary sludge with no wall thickening, pericholecystic fluid, or biliary dilation. Upper abdominal TTP worse in epigastric and RUQ region. Plan for laparoscopic cholecystectomy today. Discussed the procedure, risk, and benefits. Patient amenable to proceeding with surgery.    Plan:  - NPO  - OR today, possible discharge later today after surgery  - Medical management per primary team          Chief Complaint:     Chief Complaint   Patient presents with    Abdominal Pain    Back Pain        History is obtained from the patient and chart review.    HPI:   Tylan D Severson is a 24 year old male with a history of pancreatitis who presents with abdominal pain that started overnight around 2AM. States he went on a walk yesterday afternoon when he developed lower back pain which persisted intermittently into the evening. Went to bed then woke up around 2AM with significant abdominal pain. Pain is worse in upper abdomen. Feels like lots of pressure. Did not try any medications to help with the pain. Associated nausea, vomiting, and decreased appetite. Was feeling well prior to symptom onset. Denies fever, chills. Had similar but less severe symptoms when he was diagnosed with pancreatitis a few months ago. He did have gallstones but his diagnosis was more attributed to ETOH use. Stopped drinking alcohol for a  "month then over the last few weeks will drink socially on the weekends. Had 1 drink yesterday evening. Last meal was yesterday around 2 PM.    Not on blood thinners. No previous abdominal surgery history.         Past Medical History:   History reviewed. No pertinent past medical history.           Past Surgical History:   History reviewed. No pertinent surgical history.          Social History:               Family History:   History reviewed. No pertinent family history.           Allergies:   No Known Allergies           Medications:     Prior to Admission medications    Medication Sig Start Date End Date Taking? Authorizing Provider   FIBER ADULT GUMMIES PO Take 1 tablet by mouth daily as needed.   Yes Unknown, Entered By History              Review of Systems:   The Review of Systems is negative other than noted in the HPI          Physical Exam:   Patient Vitals for the past 24 hrs:   BP Temp Temp src Pulse Resp SpO2 Height Weight   05/08/25 1000 -- -- -- 67 -- 99 % -- --   05/08/25 0915 (!) 159/88 -- -- 85 -- 100 % -- --   05/08/25 0659 -- -- -- 71 -- 100 % -- --   05/08/25 0651 (!) 156/91 -- -- 84 16 100 % -- --   05/08/25 0633 -- -- -- 71 -- 99 % -- --   05/08/25 0559 126/89 97.6  F (36.4  C) Oral 92 19 97 % 1.854 m (6' 1\") 92.6 kg (204 lb 1.6 oz)        No intake or output data in the 24 hours ending 05/08/25 1027   Constitutional:   Awake, alert, cooperative, no apparent distress, and appears stated age     Eyes:   PERRL, conjunctiva/corneas clear, EOM's intact; no scleral edema or icterus noted      ENT:   Normocephalic, without obvious abnormality, atraumatic, Lips, mucosa, and tongue normal      Lungs:   Normal respiratory effort, no accessory muscle use     Cardiovascular:   Regular rate and rhythm     Abdomen:   Soft, non-distended, upper abdominal TTP worse in epigastric and RUQ. No rebound tenderness or guarding.     Musculoskeletal:   No obvious swelling, bruising or deformity     Skin:   Skin " color and texture normal for patient, no rashes or lesions              Data:         All imaging studies reviewed by me.    Results for orders placed or performed during the hospital encounter of 05/08/25 (from the past 24 hours)   [place] Draw    Narrative    The following orders were created for panel order [place] Draw.  Procedure                               Abnormality         Status                     ---------                               -----------         ------                     Extra Green Top (Lithiu...[[id number]]                      Final result               Extra Purple Top Tube[[id number]]                           Final result                 Please view results for these tests on the individual orders.   Extra Green Top (Lithium Heparin) Tube   Result Value Ref Range    Hold Specimen JIC    Extra Purple Top Tube   Result Value Ref Range    Hold Specimen JIC    Basic metabolic panel   Result Value Ref Range    Sodium 141 135 - 145 mmol/L    Potassium 4.2 3.4 - 5.3 mmol/L    Chloride 103 98 - 107 mmol/L    Carbon Dioxide (CO2) 18 (L) 22 - 29 mmol/L    Anion Gap 20 (H) 7 - 15 mmol/L    Urea Nitrogen 6.7 6.0 - 20.0 mg/dL    Creatinine 0.76 0.67 - 1.17 mg/dL    GFR Estimate >90 >60 mL/min/1.73m2    Calcium 8.9 8.8 - 10.4 mg/dL    Glucose 112 (H) 70 - 99 mg/dL   CBC with platelets differential    Narrative    The following orders were created for panel order CBC with platelets differential.  Procedure                               Abnormality         Status                     ---------                               -----------         ------                     CBC with platelets and ...[[id number]]  Abnormal            Final result                 Please view results for these tests on the individual orders.   Lipase   Result Value Ref Range    Lipase 38 13 - 60 U/L   Hepatic function panel   Result Value Ref Range    Protein Total 8.2 6.4 - 8.3 g/dL    Albumin 4.8 3.5 - 5.2 g/dL    Bilirubin  Total 0.3 <=1.2 mg/dL    Alkaline Phosphatase 105 40 - 150 U/L    AST 47 (H) 0 - 45 U/L    ALT 68 0 - 70 U/L    Bilirubin Direct 0.12 0.00 - 0.30 mg/dL   CBC with platelets and differential   Result Value Ref Range    WBC Count 13.2 (H) 4.0 - 11.0 10e3/uL    RBC Count 5.44 4.40 - 5.90 10e6/uL    Hemoglobin 15.6 13.3 - 17.7 g/dL    Hematocrit 44.2 40.0 - 53.0 %    MCV 81 78 - 100 fL    MCH 28.7 26.5 - 33.0 pg    MCHC 35.3 31.5 - 36.5 g/dL    RDW 12.9 10.0 - 15.0 %    Platelet Count 315 150 - 450 10e3/uL    % Neutrophils 76 %    % Lymphocytes 18 %    % Monocytes 5 %    % Eosinophils 0 %    % Basophils 0 %    % Immature Granulocytes 1 %    NRBCs per 100 WBC 0 <1 /100    Absolute Neutrophils 10.0 (H) 1.6 - 8.3 10e3/uL    Absolute Lymphocytes 2.4 0.8 - 5.3 10e3/uL    Absolute Monocytes 0.6 0.0 - 1.3 10e3/uL    Absolute Eosinophils 0.1 0.0 - 0.7 10e3/uL    Absolute Basophils 0.0 0.0 - 0.2 10e3/uL    Absolute Immature Granulocytes 0.1 <=0.4 10e3/uL    Absolute NRBCs 0.0 10e3/uL   US Abdomen Limited    Narrative    EXAM: US ABDOMEN LIMITED  LOCATION: [facility]  DATE: 5/8/2025    INDICATION: epigastric abdominal pain, history of cholelithiasis and pancreatitis  COMPARISON: 11/18/2025, CT abdomen and pelvis from 4/18/2025  TECHNIQUE: Limited abdominal ultrasound.    FINDINGS:    GALLBLADDER: Gallstones and biliary sludge in an otherwise normal gallbladder. No wall thickening, or pericholecystic fluid. Negative sonographic Hernandez's sign.    BILE DUCTS: No biliary dilatation. The common duct measures 4 mm.    LIVER: Increased echogenicity from diffuse fatty infiltration. No focal mass. The portal vein is patent with flow in the normal direction.    RIGHT KIDNEY: No hydronephrosis.    PANCREAS: The pancreas is largely obscured by overlying gas.    No ascites.      Impression    IMPRESSION:  1.  Cholelithiasis and biliary sludge without convincing evidence of acute cholecystitis. If concern for  cholecystitis persists a HIDA scan should be considered.  2.  Mild hepatic steatosis.   CT Abdomen Pelvis w Contrast    Narrative    EXAM: CT ABDOMEN PELVIS W CONTRAST  LOCATION: [facility]  DATE: 5/8/2025    INDICATION: sudden onset severe epigastric and LUQ abdominal pain and vomiting, eval for possible perfed viscous, sbo  COMPARISON: Same-day ultrasound. CT 4/18/2025.  TECHNIQUE: CT scan of the abdomen and pelvis was performed following injection of IV contrast. Multiplanar reformats were obtained. Dose reduction techniques were used.  CONTRAST: Isovue  370 90mL    FINDINGS:   LOWER CHEST: Small hiatus hernia.     HEPATOBILIARY: Mild gallbladder wall thickening and pericholecystic edema. Cholelithiasis present. No biliary dilatation. Negative liver.    PANCREAS: Normal.    SPLEEN: Normal.    ADRENAL GLANDS: Normal.    KIDNEYS/BLADDER: Normal.    BOWEL: Negative bowel and appendix. No diverticulitis.    LYMPH NODES: No adenopathy.    VASCULATURE: Nonaneurysmal aorta.    PELVIC ORGANS: Unremarkable.    MUSCULOSKELETAL: Small fat-containing umbilical hernia.      Impression    IMPRESSION:     1.  Cholelithiasis with mild pericholecystic edema, suggesting acute cholecystitis.    2.  No significant free fluid. No free air. No abscess.    3.  No other findings to explain symptoms.             EMMY Rodríguez [facility] General Surgery  [address]  [place] [address]   [facility] [phone]

## 2025-05-08 NOTE — OP NOTE
Operative Note    Name:  Tylan D Severson  PCP:  No Ref-Primary, Physician  Procedure Date:  5/8/2025       Procedure:  Procedure(s):  CHOLECYSTECTOMY, LAPAROSCOPIC     Pre-Procedure Diagnosis:  Acute cholecystitis [K81.0]     Post-Procedure Diagnosis:    Severe acute on chronic cholecystitis    Surgeon(s):    Ingrid Mojica MD     Assistant: Eileen Tellez PA-C provided retraction and exposure throughout the case and also assisted with closure.      Anesthesia Type:  General       Findings:  Significantly inflamed gallbladder with significant surrounding inflammation.  Dilated cystic duct.    Operative Report:    The patient was properly identified and brought to the operating suite where they were placed in the supine position, general anesthetic was administered and prepped and draped in a sterile fashion.  A small incision was made below the umbilicus and a Veress needle passed into the abdominal cavity which was insufflated with carbon dioxide.  A 5mm trocar port was then placed followed by the camera.  Under direct vision a 10 mm port was placed in the epigastrium and two 5 mm ports in the right upper quadrant.  The gallbladder was visualized.  It was grossly distended and thickened.  With traction on the gallbladder dissection was carried out first pulling down much adhesions from the gallbladder.  It was very distended and this was very difficult.  Eventually I was able to start dissection in the triangle of Calot where the cystic duct and artery were carefully  identified, dissected free, clipped and divided.  The cystic duct was very thickened and so I had to use large clips but I was just up against the gallbladder so I felt well away from the common bile duct.  The gallbladder was removed from the gallbladder bed with electrocautery with no difficulty, placed into an Endo Catch and pulled up through the epigastric incision.  The port was replaced and the entire area irrigated until clear.   Hemostasis was assured.  All the ports removed and each site closed with subcuticular sutures of 4-0 Monocryl.  Sterile dressings were placed.  Each site was also infiltrated with quarter percent Marcaine for a total of 30 mL.  The patient tolerated the procedure well.    Estimated Blood Loss:   20cc    Specimens:    ID Type Source Tests Collected by Time Destination   1 : Gallbladder with stones Tissue Gallbladder with Stone(s) SURGICAL PATHOLOGY EXAM Ingrid Mojica MD 5/8/2025  3:52 PM            Drains:        Complications:    None    Ingrid Mojica MD     Date: 5/8/2025  Time: 4:07 PM

## 2025-05-08 NOTE — LETTER
[address]  [address] [facility] [address]  Phone: [phone]  Fax: [phone]    May 9, 2025        Tylan D Severson  [address]   [facility] [address]          To whom it may concern:    RE: Tylan D Severson    Patient was seen and treated on 5/8/25 - 5/9/25 and missed work.     Patient may return to work on Monday 5/12/25 with the following: Light duty-unable to lift more than 20 pounds. Starting on 5/22/25 he will no longer have any lifting restrictions and can return to normal activity as tolerated.    Please contact me for questions or concerns.      Sincerely,    Eileen Tellez PA-C

## 2025-05-08 NOTE — ANESTHESIA PROCEDURE NOTES
Airway       Patient location during procedure: OR       Procedure Start/Stop Times: 5/8/2025 3:07 PM  Staff -        CRNA: Ashlie Parada APRN CRNA       Performed By: CRNAIndications and Patient Condition       Indications for airway management: tata-procedural       Induction type:intravenous       Mask difficulty assessment: 1 - vent by mask    Final Airway Details       Final airway type: endotracheal airway       Successful airway: ETT - single  Endotracheal Airway Details        ETT size (mm): 8.0       Cuffed: yes       Cuff volume (mL): 8       Successful intubation technique: direct laryngoscopy       DL Blade Type: Pop 2       Grade View of Cords: 1       Adjucts: stylet       Position: Right       Measured from: lips       Secured at (cm): 24       Bite block used: None    Post intubation assessment        Placement verified by: capnometry, equal breath sounds and chest rise        Number of attempts at approach: 1       Number of other approaches attempted: 0       Secured with: tape       Ease of procedure: easy       Dentition: Intact and Unchanged       Dental guard used and removed. Dental Guard Type: Standard White.    Medication(s) Administered   Medication Administration Time: 5/8/2025 3:07 PM

## 2025-05-08 NOTE — H&P
"[facility]    History and Physical - Hospitalist Service       Date of Admission:  5/8/2025    Assessment & Plan      Tylan D Severson is a 24 year old male admitted with acute cholecystitis.  He is clinically stable.  He was seen last month in the ED for abdominal pain, which was multifocal in nature.  He was diagnosed with pancreatitis.  It is possible this was due to gallstones rather than alcohol use as previously thought.  Continue with empiric abx pending surgical intervention.    I counseled the patient on alcohol cessation given fatty liver disease.    # Acute cholecystitis  - empiric abx  - NPO  - surgery consult    # Outpatient followup  - hepatic steatosis (GI clinic declined referral for steatosis and recommended e-consult; followup recommendation for hepatology followup placed in discharge orders)         Observation Goals: - procedure completion, Nurse to notify provider when observation goals have been met and patient is ready for discharge.  Diet: NPO for Medical/Clinical Reasons Except for: Meds    Hernandez Catheter: Not present  Lines: None     Cardiac Monitoring: None  Code Status: Full Code    Clinically Significant Risk Factors Present on Admission              # Anion Gap Metabolic Acidosis: Highest Anion Gap = 20 mmol/L in last 2 days, will monitor and treat as appropriate                # Overweight: Estimated body mass index is 26.93 kg/m  as calculated from the following:    Height as of this encounter: 1.854 m (6' 1\").    Weight as of this encounter: 92.6 kg (204 lb 1.6 oz).              Disposition Plan     Medically Ready for Discharge: Anticipated Tomorrow           Dieter Ingram MD  Hospitalist Service  [facility]  Securely message with One Touch EMR (more info)  Text page via [facility] Paging/Directory     ______________________________________________________________________    Chief Complaint   Abdominal pain    History is obtained from the " patient    History of Present Illness   Tylan D Severson is a 24 year old male who presents for evaluation of abdominal pain.  He reports the development abdominal pain yesterday, which was cramping in nature.  It radiated to the mid back.  It initially resolved and then recurred last night.  It was associated with anorexia.  This morning, he developed worsening epigastric abdominal pain.  It had been constant in nature until being in the ED here.  He has had radiation up the chest as well.  Denies fevers, chills, dyspnea, or chest pressure.    Currently he reports central back pain with radiation upward.  Denies abdominal pain currently and reports nausea has improved.        I also communicated directly with the ED provider and surgeon regarding the patient's plan of care.    Past Medical History    History reviewed. No pertinent past medical history.    Past Surgical History   History reviewed. No pertinent surgical history.    Prior to Admission Medications   Prior to Admission Medications   Prescriptions Last Dose Informant Patient Reported? Taking?   FIBER ADULT GUMMIES PO Past Week  Yes Yes   Sig: Take 1 tablet by mouth daily as needed.      Facility-Administered Medications: None           Physical Exam   Vital Signs: Temp: 97.6  F (36.4  C) Temp src: Oral BP: (!) 159/88 Pulse: 67   Resp: 16 SpO2: 99 % O2 Device: None (Room air)    Weight: 204 lbs 1.6 oz    Gen:  lying in bed in no extremis  Neuro:  alert, conversant  CV:  nl rate, regular rhythm  Pulm:  no acute resp distress, ctab anteriorly  GI:  abdomen soft, upper abdominal TTP    Medical Decision Making             Data   Reviewed:    Na 141  K 4.2  BUN 6.7  Cr 0.76    Alk phos 105  ALT 68  AST 47  T bili 0.3    WBC 13  Hgb 16  Plts 315    CT ab/pelvis    1.  Cholelithiasis with mild pericholecystic edema, suggesting acute cholecystitis.     2.  No significant free fluid. No free air. No abscess.     3.  No other findings to explain symptoms.    Ab  US  1.  Cholelithiasis and biliary sludge without convincing evidence of acute cholecystitis. If concern for cholecystitis persists a HIDA scan should be considered.  2.  Mild hepatic steatosis.

## 2025-05-08 NOTE — ANESTHESIA CARE TRANSFER NOTE
Patient: Tylan D Severson    Procedure: Procedure(s):  CHOLECYSTECTOMY, LAPAROSCOPIC       Diagnosis: Acute cholecystitis [K81.0]  Diagnosis Additional Information: No value filed.    Anesthesia Type:   General     Note:    Oropharynx: oral airway in place and spontaneously breathing  Level of Consciousness: unresponsive  Oxygen Supplementation: face mask  Level of Supplemental Oxygen (L/min / FiO2): 8  Independent Airway: airway patency satisfactory and stable  Dentition: dentition unchanged  Vital Signs Stable: post-procedure vital signs reviewed and stable  Report to RN Given: handoff report given  Patient transferred to: PACU    Handoff Report: Identifed the Patient, Identified the Reponsible Provider, Reviewed the pertinent medical history, Discussed the surgical course, Reviewed Intra-OP anesthesia mangement and issues during anesthesia, Set expectations for post-procedure period and Allowed opportunity for questions and acknowledgement of understanding      Vitals:  Vitals Value Taken Time   /66 05/08/25 16:30   Temp 37.3  C (99.2  F) 05/08/25 16:30   Pulse 75 05/08/25 16:34   Resp 11 05/08/25 16:34   SpO2 100 % 05/08/25 16:34   Vitals shown include unfiled device data.    Electronically Signed By: PADMINI Griffin CRNA  May 8, 2025  4:35 PM

## 2025-05-09 VITALS
WEIGHT: 202.5 LBS | BODY MASS INDEX: 26.84 KG/M2 | DIASTOLIC BLOOD PRESSURE: 69 MMHG | TEMPERATURE: 97.7 F | HEIGHT: 73 IN | OXYGEN SATURATION: 97 % | RESPIRATION RATE: 16 BRPM | SYSTOLIC BLOOD PRESSURE: 126 MMHG | HEART RATE: 65 BPM

## 2025-05-09 LAB
ALBUMIN SERPL BCG-MCNC: 4.1 G/DL (ref 3.5–5.2)
ALP SERPL-CCNC: 85 U/L (ref 40–150)
ALT SERPL W P-5'-P-CCNC: 64 U/L (ref 0–70)
ANION GAP SERPL CALCULATED.3IONS-SCNC: 14 MMOL/L (ref 7–15)
AST SERPL W P-5'-P-CCNC: 65 U/L (ref 0–45)
BILIRUB SERPL-MCNC: 0.9 MG/DL
BUN SERPL-MCNC: 6.9 MG/DL (ref 6–20)
CALCIUM SERPL-MCNC: 9 MG/DL (ref 8.8–10.4)
CHLORIDE SERPL-SCNC: 102 MMOL/L (ref 98–107)
CREAT SERPL-MCNC: 0.81 MG/DL (ref 0.67–1.17)
EGFRCR SERPLBLD CKD-EPI 2021: >90 ML/MIN/1.73M2
ERYTHROCYTE [DISTWIDTH] IN BLOOD BY AUTOMATED COUNT: 12.8 % (ref 10–15)
GLUCOSE BLDC GLUCOMTR-MCNC: 123 MG/DL (ref 70–99)
GLUCOSE SERPL-MCNC: 114 MG/DL (ref 70–99)
HCO3 SERPL-SCNC: 24 MMOL/L (ref 22–29)
HCT VFR BLD AUTO: 37.7 % (ref 40–53)
HGB BLD-MCNC: 13.1 G/DL (ref 13.3–17.7)
MCH RBC QN AUTO: 28.9 PG (ref 26.5–33)
MCHC RBC AUTO-ENTMCNC: 34.7 G/DL (ref 31.5–36.5)
MCV RBC AUTO: 83 FL (ref 78–100)
PLATELET # BLD AUTO: 258 10E3/UL (ref 150–450)
POTASSIUM SERPL-SCNC: 4.3 MMOL/L (ref 3.4–5.3)
PROT SERPL-MCNC: 6.7 G/DL (ref 6.4–8.3)
RBC # BLD AUTO: 4.54 10E6/UL (ref 4.4–5.9)
SODIUM SERPL-SCNC: 140 MMOL/L (ref 135–145)
WBC # BLD AUTO: 14.5 10E3/UL (ref 4–11)

## 2025-05-09 PROCEDURE — 82962 GLUCOSE BLOOD TEST: CPT

## 2025-05-09 PROCEDURE — 36415 COLL VENOUS BLD VENIPUNCTURE: CPT | Performed by: SPECIALIST

## 2025-05-09 PROCEDURE — 250N000011 HC RX IP 250 OP 636: Performed by: SPECIALIST

## 2025-05-09 PROCEDURE — 85014 HEMATOCRIT: CPT | Performed by: SPECIALIST

## 2025-05-09 PROCEDURE — 82565 ASSAY OF CREATININE: CPT | Performed by: SPECIALIST

## 2025-05-09 PROCEDURE — G0378 HOSPITAL OBSERVATION PER HR: HCPCS

## 2025-05-09 PROCEDURE — 258N000003 HC RX IP 258 OP 636: Performed by: SPECIALIST

## 2025-05-09 PROCEDURE — 96376 TX/PRO/DX INJ SAME DRUG ADON: CPT

## 2025-05-09 PROCEDURE — 250N000013 HC RX MED GY IP 250 OP 250 PS 637: Performed by: SPECIALIST

## 2025-05-09 PROCEDURE — 99239 HOSP IP/OBS DSCHRG MGMT >30: CPT | Performed by: HOSPITALIST

## 2025-05-09 RX ORDER — ACETAMINOPHEN 500 MG
500-1000 TABLET ORAL EVERY 6 HOURS PRN
COMMUNITY
Start: 2025-05-09

## 2025-05-09 RX ORDER — OXYCODONE HYDROCHLORIDE 5 MG/1
5 TABLET ORAL EVERY 6 HOURS PRN
Qty: 6 TABLET | Refills: 0 | Status: SHIPPED | OUTPATIENT
Start: 2025-05-09 | End: 2025-05-12

## 2025-05-09 RX ADMIN — HYDROCODONE BITARTRATE AND ACETAMINOPHEN 1 TABLET: 5; 325 TABLET ORAL at 08:20

## 2025-05-09 RX ADMIN — SODIUM CHLORIDE: 0.9 INJECTION, SOLUTION INTRAVENOUS at 05:59

## 2025-05-09 RX ADMIN — SENNOSIDES AND DOCUSATE SODIUM 1 TABLET: 50; 8.6 TABLET ORAL at 08:17

## 2025-05-09 RX ADMIN — POLYETHYLENE GLYCOL 3350 17 G: 17 POWDER, FOR SOLUTION ORAL at 08:17

## 2025-05-09 RX ADMIN — PIPERACILLIN AND TAZOBACTAM 3.38 G: 3; .375 INJECTION, POWDER, FOR SOLUTION INTRAVENOUS at 03:30

## 2025-05-09 RX ADMIN — PIPERACILLIN AND TAZOBACTAM 3.38 G: 3; .375 INJECTION, POWDER, FOR SOLUTION INTRAVENOUS at 08:32

## 2025-05-09 RX ADMIN — HYDROCODONE BITARTRATE AND ACETAMINOPHEN 1 TABLET: 5; 325 TABLET ORAL at 14:29

## 2025-05-09 RX ADMIN — ACETAMINOPHEN 975 MG: 325 TABLET, FILM COATED ORAL at 06:00

## 2025-05-09 RX ADMIN — HYDROCODONE BITARTRATE AND ACETAMINOPHEN 1 TABLET: 5; 325 TABLET ORAL at 00:47

## 2025-05-09 ASSESSMENT — ACTIVITIES OF DAILY LIVING (ADL)
ADLS_ACUITY_SCORE: 37
ADLS_ACUITY_SCORE: 37
ADLS_ACUITY_SCORE: 36
ADLS_ACUITY_SCORE: 37
ADLS_ACUITY_SCORE: 36
ADLS_ACUITY_SCORE: 37

## 2025-05-09 NOTE — ANESTHESIA POSTPROCEDURE EVALUATION
Patient: Tylan D Severson    Procedure: Procedure(s):  CHOLECYSTECTOMY, LAPAROSCOPIC       Anesthesia Type:  General    Note:  Disposition: Admission   Postop Pain Control: Uneventful            Sign Out: Well controlled pain   PONV: No   Neuro/Psych: Uneventful            Sign Out: Acceptable/Baseline neuro status   Airway/Respiratory: Uneventful            Sign Out: Acceptable/Baseline resp. status   CV/Hemodynamics: Uneventful            Sign Out: Acceptable CV status; No obvious hypovolemia; No obvious fluid overload   Other NRE: NONE   DID A NON-ROUTINE EVENT OCCUR? No           Last vitals:  Vitals Value Taken Time   /71 05/08/25 17:40   Temp 37.2  C (98.9  F) 05/08/25 17:20   Pulse 66 05/08/25 17:44   Resp 14 05/08/25 17:44   SpO2 98 % 05/08/25 17:44   Vitals shown include unfiled device data.    Electronically Signed By: Ronal Malone MD  May 8, 2025  7:04 PM

## 2025-05-09 NOTE — PLAN OF CARE
PRIMARY DIAGNOSIS: ACUTE CHOLECYSTITIS   OUTPATIENT/OBSERVATION GOALS TO BE MET BEFORE DISCHARGE:  ADLs back to baseline: No    Activity and level of assistance: Up with standby assistance.    Pain status: Improved-controlled with oral pain medications.    Return to near baseline physical activity: Yes     Discharge Planner Nurse   Safe discharge environment identified: Yes  Barriers to discharge: Yes       Entered by: Sybil Nunez RN 05/09/2025 5:02 AM     Patient resting in bed. Given scheduled Zosyn at this time. IV fluids infusing at 100ml/hr. Lap sites CDI. Up with SBA. Bed alarm in place for patient's safety.

## 2025-05-09 NOTE — PLAN OF CARE
PRIMARY DIAGNOSIS: ACUTE CHOLECYSTITIS  OUTPATIENT/OBSERVATION GOALS TO BE MET BEFORE DISCHARGE:  ADLs back to baseline: No    Activity and level of assistance: Up with standby assistance.    Pain status: Improved-controlled with oral pain medications.    Return to near baseline physical activity: Yes     Patient is alert and orientated times four. Given prn Norco for pain at this time. Lap sites CDI. Bowel sounds faint hypoactive. Denies passing gas. Educated on importance for getting up and ambulating. IV fluids infusing at 100ml/hr. Up minimal assist. Bed alarm in place for patient's safety.

## 2025-05-09 NOTE — PROGRESS NOTES
PRIMARY DIAGNOSIS: ACUTE CHOLECYSTITIS OUTPATIENT/OBSERVATION GOALS TO BE MET BEFORE DISCHARGE:  1. Pain Status: Improved-controlled with oral pain medications.    2. Return to near baseline physical activity: ambulated around unit this morning     3. Cleared for discharge by consultants (if involved): General surgery OK to discharge from surgical standpoint per note likely later today     Discharge Planner Nurse   Safe discharge environment identified: Yes  Barriers to discharge: Yes       Entered by: Sarahi Buchanan RN 05/09/2025 11:10 AM

## 2025-05-09 NOTE — PLAN OF CARE
Goal Outcome Evaluation:      Plan of Care Reviewed With: patient    Overall Patient Progress: improvingOverall Patient Progress: improving    Outcome Evaluation: Admitted from PACU. Had Lap Daniella. Given PRN [place] for pain. After getting up and ambulating to the bathroom pain to upper abdomen increased. IV Dilaudid and IV Zofran utilized. Pt is tolerating diet well. Advanced for breakfast.

## 2025-05-09 NOTE — DISCHARGE SUMMARY
"[facility]  Hospitalist Discharge Summary      Date of Admission:  5/8/2025  Date of Discharge:  5/9/2025  Discharging Provider: Lewis Lucio MD  Discharge Service: Hospitalist Service    Discharge Diagnoses   Acute cholecystitis  Hepatic steatosis   Abdominal pain     Clinically Significant Risk Factors     # Overweight: Estimated body mass index is 26.72 kg/m  as calculated from the following:    Height as of this encounter: 1.854 m (6' 1\").    Weight as of this encounter: 91.9 kg (202 lb 8 oz).       Follow-ups Needed After Discharge   Follow-up Appointments       Hospital to Primary Care - Establish PCP Referral      Please be aware that coverage of these services is subject to the terms and limitations of your health insurance plan.  Call member services at your health plan with any benefit or coverage questions.    Schedule Primary Care visit within: 30 Days   Recommended labs and Imaging (to be ordered by Primary Care Provider): followup hepatic steatosis (recomend GI consultation/hepatology e consult)       Follow Up      From your Surgeon:    Follow up:  For straightforward laparoscopic procedures, our practice is to check-in with you over the phone a few days after your procedure.  If you would like a scheduled follow up appointment in clinic, please call us at [phone] to schedule an appointment at your convenience.  If you would prefer to follow up with us further by phone, please let us know so that we may contact you 2-3 weeks following your procedure.        Hospital to Primary Care - Establish PCP Referral      Please be aware that coverage of these services is subject to the terms and limitations of your health insurance plan.  Call member services at your health plan with any benefit or coverage questions.    Schedule Primary Care visit within: 30 Days               Unresulted Labs Ordered in the Past 30 Days of this Admission       Date and Time Order Name Status " Description    5/8/2025  3:52 PM Surgical Pathology Exam In process         These results will be followed up by Ordering Team     Discharge Disposition   Discharged to home  Condition at discharge: Good    Hospital Course      Tylan D Severson is a 24 year old male admitted with acute cholecystitis and underwent cholecystectomy without complication. Notably, he was seen last month in the ED for abdominal pain, which was multifocal in nature, and was diagnosed with pancreatitis; it is possible this was due to gallstones and biliary colic rather than alcohol use as previously thought. Follow-up with PCP ([facility] care). 5-day Augmentin course recommended by surgery at discharge.     Consultations This Hospital Stay   None    Code Status   Full Code    Time Spent on this Encounter   I, Lewis Lucio MD, personally saw the patient today and spent greater than 30 minutes discharging this patient.       Lewis Lucio MD  [address] [address]  Phone: [phone]  Fax: [phone]  ______________________________________________________________________    Physical Exam   Vital Signs: Temp: 97.7  F (36.5  C) Temp src: Oral BP: 126/69 Pulse: 65   Resp: 16 SpO2: 97 % O2 Device: None (Room air) Oxygen Delivery: 2 LPM  Weight: 202 lbs 8 oz  GENERAL:  Alert, appears comfortable, in no acute distress, appears stated age   HEAD:  Normocephalic, without obvious abnormality, atraumatic   EYES:  PERRL, conjunctiva/corneas clear, no scleral icterus, EOM's intact   NOSE: Nares normal, septum midline, mucosa normal, no drainage   THROAT: Lips, mucosa, and tongue normal; teeth and gums normal, mouth moist   NECK: Supple, symmetrical, trachea midline   BACK:   Symmetric, no curvature, ROM normal   LUNGS:   Clear to auscultation bilaterally, no rales, rhonchi, or wheezing, symmetric chest rise on inhalation, respirations unlabored   CHEST WALL:  No tenderness  or deformity   HEART:  Regular rate and rhythm, S1 and S2 normal, no murmur, rub, or gallop    ABDOMEN:   Soft, non-tender, bowel sounds active all four quadrants, no masses, no organomegaly, no rebound or guarding   EXTREMITIES: Extremities normal, atraumatic, no cyanosis or edema    SKIN: Dry to touch, no exanthems in the visualized areas   NEURO: Alert, oriented x 4, moves all four extremities freely/spontaneously   PSYCH: Cooperative, behavior is appropriate         Primary Care Physician   Physician No Ref-Primary    Discharge Orders      Hospital to Primary Care - Establish PCP Referral      Hospital to Primary Care - Establish PCP Referral      Reason for your hospital stay    You were treated for gallbladder inflammation (acute cholecystitis).     Activity    You should avoid lifting anything more than 20 pounds or strenuous physical activity for a total of 2 weeks. Walking does not count as strenuous physical activity.  You are safe to walk up and down stairs.     When to contact your care team    Contact your Surgeon IF:  ~  Your pain is not controlled by pain medication or pain that suddenly increases;  ~  You develop a fever greater than 101 and/or chills 24 hours after surgery;  ~  You notice redness or bad smelling drainage at the surgery site;  ~  You notice a large amount of bleeding from the surgery site that does not stop when moderate pressure is applied;  ~  You are unable to pass urine within 8-10 hours after surgery;  ~  You have an upset stomach or vomiting lasting more than a day;  ~  You have a skin reaction to tape or dressing such as redness, itching or rash at the surgery site.     Wound care and dressings    You may remove your outer dressings after a period of 48 hours. The small white strips on the incisions act like artificial scabs, and will begin to peel at the edges at around 7-10 days.  These can then be removed.     It is normal to have a small rim of red present around the  incisions. This should not, however, extend beyond 1/4 inch from the incision.  If your incisions become increasingly tender, red, or draining, please contact us.    You may shower after 48 hours from surgery.  It is ok to get your incisions wet, but avoid rubbing them.  Avoid soaking in bath tubs, or swimming in lakes, pools, or streams for 2 weeks following surgery.     Follow Up    From your Surgeon:    Follow up:  For straightforward laparoscopic procedures, our practice is to check-in with you over the phone a few days after your procedure.  If you would like a scheduled follow up appointment in clinic, please call us at [phone] to schedule an appointment at your convenience.  If you would prefer to follow up with us further by phone, please let us know so that we may contact you 2-3 weeks following your procedure.     Reason for your hospital stay    Acute cholecystitis (gallbladder infection)     Activity    Your activity upon discharge: activity as tolerated and no driving for today     Diet    You should follow a regular diet when you go home.  Patients can have difficulty with constipation following surgery, due in part to the administration of narcotic medications.  If you are suffering with constipation, you should avoid foods such as hard cheeses or red meat. Foods high in fiber are recommended.     Diet    Follow this diet upon discharge: Orders Placed This Encounter      Advance Diet as Tolerated: Regular Diet Adult      Diet         Significant Results and Procedures   Most Recent 3 CBC's:  Recent Labs   Lab Test 05/09/25  0631 05/08/25  0611 04/18/25  1302   WBC 14.5* 13.2* 11.9*   HGB 13.1* 15.6 14.7   MCV 83 81 82    315 384     Most Recent 3 BMP's:  Recent Labs   Lab Test 05/09/25  0631 05/09/25  0608 05/08/25  0611 04/18/25  1302     --  141 141   POTASSIUM 4.3  --  4.2 4.2   CHLORIDE 102  --  103 104   CO2 24  --  18* 24   BUN 6.9  --  6.7 5.9*   CR 0.81  --  0.76 0.75    ANIONGAP 14  --  20* 13   SONYA 9.0  --  8.9 10.1   * 123* 112* 103*     Most Recent 2 LFT's:  Recent Labs   Lab Test 05/09/25  0631 05/08/25  0611   AST 65* 47*   ALT 64 68   ALKPHOS 85 105   BILITOTAL 0.9 0.3   ,   Results for orders placed or performed during the hospital encounter of 05/08/25   US Abdomen Limited    Narrative    EXAM: US ABDOMEN LIMITED  LOCATION: [facility]  DATE: 5/8/2025    INDICATION: epigastric abdominal pain, history of cholelithiasis and pancreatitis  COMPARISON: 11/18/2025, CT abdomen and pelvis from 4/18/2025  TECHNIQUE: Limited abdominal ultrasound.    FINDINGS:    GALLBLADDER: Gallstones and biliary sludge in an otherwise normal gallbladder. No wall thickening, or pericholecystic fluid. Negative sonographic Hernandez's sign.    BILE DUCTS: No biliary dilatation. The common duct measures 4 mm.    LIVER: Increased echogenicity from diffuse fatty infiltration. No focal mass. The portal vein is patent with flow in the normal direction.    RIGHT KIDNEY: No hydronephrosis.    PANCREAS: The pancreas is largely obscured by overlying gas.    No ascites.      Impression    IMPRESSION:  1.  Cholelithiasis and biliary sludge without convincing evidence of acute cholecystitis. If concern for cholecystitis persists a HIDA scan should be considered.  2.  Mild hepatic steatosis.   CT Abdomen Pelvis w Contrast    Narrative    EXAM: CT ABDOMEN PELVIS W CONTRAST  LOCATION: [facility]  DATE: 5/8/2025    INDICATION: sudden onset severe epigastric and LUQ abdominal pain and vomiting, eval for possible perfed viscous, sbo  COMPARISON: Same-day ultrasound. CT 4/18/2025.  TECHNIQUE: CT scan of the abdomen and pelvis was performed following injection of IV contrast. Multiplanar reformats were obtained. Dose reduction techniques were used.  CONTRAST: Isovue  370 90mL    FINDINGS:   LOWER CHEST: Small hiatus hernia.     HEPATOBILIARY: Mild gallbladder wall  thickening and pericholecystic edema. Cholelithiasis present. No biliary dilatation. Negative liver.    PANCREAS: Normal.    SPLEEN: Normal.    ADRENAL GLANDS: Normal.    KIDNEYS/BLADDER: Normal.    BOWEL: Negative bowel and appendix. No diverticulitis.    LYMPH NODES: No adenopathy.    VASCULATURE: Nonaneurysmal aorta.    PELVIC ORGANS: Unremarkable.    MUSCULOSKELETAL: Small fat-containing umbilical hernia.      Impression    IMPRESSION:     1.  Cholelithiasis with mild pericholecystic edema, suggesting acute cholecystitis.    2.  No significant free fluid. No free air. No abscess.    3.  No other findings to explain symptoms.         Discharge Medications   Current Discharge Medication List        START taking these medications    Details   amoxicillin-clavulanate (AUGMENTIN) 875-125 MG tablet Take 1 tablet by mouth 2 times daily for 5 days.  Qty: 10 tablet, Refills: 0    Associated Diagnoses: Acute cholecystitis           CONTINUE these medications which have NOT CHANGED    Details   FIBER ADULT GUMMIES PO Take 1 tablet by mouth daily as needed.           Allergies   No Known Allergies

## 2025-05-09 NOTE — PROGRESS NOTES
General Surgery Progress Note:    Hospital Day # 0    ASSESSMENT:  1. Hepatic steatosis    2. Acute cholecystitis      Tylan D Severson is a 24 year old male who is s/p laparoscopic cholecystectomy on 5/8/2025.  Patient progressing well post-operatively. Labs notable for leukocytosis trending up which is not uncommon postoperatively and normal T. Bili (0.3 to 0.9). Pain is controlled and discussed utilizing PO pain meds. If pain is controlled with PO meds and tolerating diet then OK to discharge a surgical standpoint when deemed medically appropriate, likely later today.    PLAN:  - Diet as tolerated  - Discussed utilizing PO pain medications  - Encourage activity and ambulation to promote bowel function  - Recommend 5 days of Augmentin at discharge  - Medical management per primary team  - OK to discharge from surgical standpoint when deemed medically appropriate   - Discharge recommendations and instructions placed in AVS     SUBJECTIVE:   He is doing okay today. Reports abdominal pain worse on right side and exacerbated with movement but tolerable with pain medications. Tolerating liquids with no nausea or vomiting and ordered a regular diet for breakfast. Denies fever, chills.     Patient Vitals for the past 24 hrs:   BP Temp Temp src Pulse Resp SpO2 Weight   05/09/25 0823 126/69 97.7  F (36.5  C) Oral 65 16 97 % --   05/09/25 0612 -- -- -- -- -- -- 91.9 kg (202 lb 8 oz)   05/09/25 0458 109/55 97.4  F (36.3  C) Oral 57 16 97 % --   05/09/25 0112 111/56 97.7  F (36.5  C) Oral 67 18 98 % --   05/08/25 2100 134/75 97.6  F (36.4  C) Oral 83 18 98 % --   05/08/25 1952 (!) 146/84 98.1  F (36.7  C) Oral 95 16 97 % --   05/08/25 1909 133/75 97.4  F (36.3  C) Oral 88 18 97 % --   05/08/25 1833 131/65 97.4  F (36.3  C) Oral 68 14 98 % --   05/08/25 1802 122/69 97.1  F (36.2  C) Oral 64 16 97 % --   05/08/25 1740 131/71 -- -- 80 17 99 % --   05/08/25 1730 127/75 -- -- 66 12 100 % --   05/08/25 1720 130/80 98.9  F (37.2  C)  Temporal 67 18 98 % --   05/08/25 1710 134/79 -- -- 73 14 100 % --   05/08/25 1700 117/63 -- -- 66 12 100 % --   05/08/25 1650 122/64 -- -- 69 11 100 % --   05/08/25 1640 123/59 -- -- 70 12 100 % --   05/08/25 1630 117/66 99.2  F (37.3  C) Temporal 79 10 100 % --   05/08/25 1300 (!) 141/109 97.8  F (36.6  C) Core 78 16 99 % --   05/08/25 1145 -- 97.7  F (36.5  C) -- 78 16 100 % --   05/08/25 1130 -- -- -- 65 -- 99 % --   05/08/25 1100 -- -- -- 67 -- 100 % --   05/08/25 1045 -- -- -- 71 -- 100 % --   05/08/25 1000 -- -- -- 67 -- 99 % --     PHYSICAL EXAM:  General: patient seen resting in bed, no acute distress  Resp: no respiratory distress, breathing comfortably on RA  Abdomen: soft, non-distended, appropriately tender post-operatively around incision sites and RUQ. Incisions clean, dry, intact with steri strips and band aids.   Extremities: warm and well perfused    05/08 0700 - 05/09 0659  In: 3500 [P.O.:600; I.V.:2900]  Out: 20     Admission on 05/08/2025   Component Date Value    Hold Specimen 05/08/2025 [facility]     Hold Specimen 05/08/2025 [facility]     Sodium 05/08/2025 141     Potassium 05/08/2025 4.2     Chloride 05/08/2025 103     Carbon Dioxide (CO2) 05/08/2025 18 (L)     Anion Gap 05/08/2025 20 (H)     Urea Nitrogen 05/08/2025 6.7     Creatinine 05/08/2025 0.76     GFR Estimate 05/08/2025 >90     Calcium 05/08/2025 8.9     Glucose 05/08/2025 112 (H)     Lipase 05/08/2025 38     Protein Total 05/08/2025 8.2     Albumin 05/08/2025 4.8     Bilirubin Total 05/08/2025 0.3     Alkaline Phosphatase 05/08/2025 105     AST 05/08/2025 47 (H)     ALT 05/08/2025 68     Bilirubin Direct 05/08/2025 0.12     WBC Count 05/08/2025 13.2 (H)     RBC Count 05/08/2025 5.44     Hemoglobin 05/08/2025 15.6     Hematocrit 05/08/2025 44.2     MCV 05/08/2025 81     MCH 05/08/2025 28.7     MCHC 05/08/2025 35.3     RDW 05/08/2025 12.9     Platelet Count 05/08/2025 315     % Neutrophils 05/08/2025 76     % Lymphocytes 05/08/2025 18     %  Monocytes 05/08/2025 5     % Eosinophils 05/08/2025 0     % Basophils 05/08/2025 0     % Immature Granulocytes 05/08/2025 1     NRBCs per 100 WBC 05/08/2025 0     Absolute Neutrophils 05/08/2025 10.0 (H)     Absolute Lymphocytes 05/08/2025 2.4     Absolute Monocytes 05/08/2025 0.6     Absolute Eosinophils 05/08/2025 0.1     Absolute Basophils 05/08/2025 0.0     Absolute Immature Granul* 05/08/2025 0.1     Absolute NRBCs 05/08/2025 0.0     Sodium 05/09/2025 140     Potassium 05/09/2025 4.3     Carbon Dioxide (CO2) 05/09/2025 24     Anion Gap 05/09/2025 14     Urea Nitrogen 05/09/2025 6.9     Creatinine 05/09/2025 0.81     GFR Estimate 05/09/2025 >90     Calcium 05/09/2025 9.0     Chloride 05/09/2025 102     Glucose 05/09/2025 114 (H)     Alkaline Phosphatase 05/09/2025 85     AST 05/09/2025 65 (H)     ALT 05/09/2025 64     Protein Total 05/09/2025 6.7     Albumin 05/09/2025 4.1     Bilirubin Total 05/09/2025 0.9     WBC Count 05/09/2025 14.5 (H)     RBC Count 05/09/2025 4.54     Hemoglobin 05/09/2025 13.1 (L)     Hematocrit 05/09/2025 37.7 (L)     MCV 05/09/2025 83     MCH 05/09/2025 28.9     MCHC 05/09/2025 34.7     RDW 05/09/2025 12.8     Platelet Count 05/09/2025 258     GLUCOSE BY METER POCT 05/09/2025 123 (H)         Eileen Tellez PA-C  [facility] General Surgery  [address]  [place] [address]

## 2025-05-09 NOTE — PROGRESS NOTES
PRIMARY DIAGNOSIS: ACUTE CHOLECYSTITIS OUTPATIENT/OBSERVATION GOALS TO BE MET BEFORE DISCHARGE:  1. Pain Status: Improved-controlled with oral pain medications.    2. Return to near baseline physical activity: Yes    3. Cleared for discharge by consultants (if involved):Yes    Discharge Planner Nurse   Safe discharge environment identified: Yes  Barriers to discharge: No       Entered by: Sarahi Buchanan RN 05/09/2025 3:52 PM

## 2025-05-12 ENCOUNTER — TELEPHONE (OUTPATIENT)
Dept: SURGERY | Facility: CLINIC | Age: 25
End: 2025-05-12

## 2025-05-12 LAB
PATH REPORT.COMMENTS IMP SPEC: NORMAL
PATH REPORT.COMMENTS IMP SPEC: NORMAL
PATH REPORT.FINAL DX SPEC: NORMAL
PATH REPORT.GROSS SPEC: NORMAL
PATH REPORT.MICROSCOPIC SPEC OTHER STN: NORMAL
PATH REPORT.RELEVANT HX SPEC: NORMAL
PHOTO IMAGE: NORMAL

## 2025-05-12 NOTE — TELEPHONE ENCOUNTER
[facility] Post-Op Phone Call                    Surgeon: Ingrid Mojica MD    Date of Surgery: 05/08/2025  Surgery: Laparoscopic Cholecystectomy  Discharge Date: 05/09/2025    Date/Time Called:   Date: 5/12/2025 Time: 12:42 PM   Attempt: First    RN called patient to complete post-op call. No answer and left message for patient to call clinic for any questions or concerns regarding recovery.    Asia GARCIA RN, BSN

## 2025-06-03 ENCOUNTER — APPOINTMENT (OUTPATIENT)
Dept: CT IMAGING | Facility: CLINIC | Age: 25
End: 2025-06-03
Attending: EMERGENCY MEDICINE

## 2025-06-03 ENCOUNTER — HOSPITAL ENCOUNTER (INPATIENT)
Facility: CLINIC | Age: 25
LOS: 1 days | Discharge: ANOTHER HOSPITAL | End: 2025-06-04
Attending: EMERGENCY MEDICINE | Admitting: STUDENT IN AN ORGANIZED HEALTH CARE EDUCATION/TRAINING PROGRAM

## 2025-06-03 DIAGNOSIS — R10.13 EPIGASTRIC PAIN: ICD-10-CM

## 2025-06-03 DIAGNOSIS — K80.50 CHOLEDOCHOLITHIASIS: Primary | ICD-10-CM

## 2025-06-03 LAB
ALBUMIN SERPL BCG-MCNC: 4.5 G/DL (ref 3.5–5.2)
ALBUMIN UR-MCNC: 10 MG/DL
ALP SERPL-CCNC: 109 U/L (ref 40–150)
ALT SERPL W P-5'-P-CCNC: 109 U/L (ref 0–70)
ANION GAP SERPL CALCULATED.3IONS-SCNC: 17 MMOL/L (ref 7–15)
APPEARANCE UR: CLEAR
AST SERPL W P-5'-P-CCNC: 116 U/L (ref 0–45)
BASOPHILS # BLD AUTO: 0 10E3/UL (ref 0–0.2)
BASOPHILS NFR BLD AUTO: 0 %
BILIRUB SERPL-MCNC: 1.4 MG/DL
BILIRUB UR QL STRIP: NEGATIVE
BUN SERPL-MCNC: 8.5 MG/DL (ref 6–20)
CALCIUM SERPL-MCNC: 9.8 MG/DL (ref 8.8–10.4)
CHLORIDE SERPL-SCNC: 102 MMOL/L (ref 98–107)
COLOR UR AUTO: YELLOW
CREAT SERPL-MCNC: 0.82 MG/DL (ref 0.67–1.17)
EGFRCR SERPLBLD CKD-EPI 2021: >90 ML/MIN/1.73M2
EOSINOPHIL # BLD AUTO: 0.3 10E3/UL (ref 0–0.7)
EOSINOPHIL NFR BLD AUTO: 3 %
ERYTHROCYTE [DISTWIDTH] IN BLOOD BY AUTOMATED COUNT: 12.7 % (ref 10–15)
ETHANOL SERPL-MCNC: <0.01 G/DL
GLUCOSE SERPL-MCNC: 147 MG/DL (ref 70–99)
GLUCOSE UR STRIP-MCNC: NEGATIVE MG/DL
HCO3 SERPL-SCNC: 22 MMOL/L (ref 22–29)
HCT VFR BLD AUTO: 41.9 % (ref 40–53)
HGB BLD-MCNC: 14.2 G/DL (ref 13.3–17.7)
HGB UR QL STRIP: NEGATIVE
IMM GRANULOCYTES # BLD: 0 10E3/UL
IMM GRANULOCYTES NFR BLD: 0 %
INR PPP: 0.99 (ref 0.85–1.15)
KETONES UR STRIP-MCNC: NEGATIVE MG/DL
LEUKOCYTE ESTERASE UR QL STRIP: NEGATIVE
LIPASE SERPL-CCNC: 23 U/L (ref 13–60)
LYMPHOCYTES # BLD AUTO: 4.5 10E3/UL (ref 0.8–5.3)
LYMPHOCYTES NFR BLD AUTO: 49 %
MCH RBC QN AUTO: 28.2 PG (ref 26.5–33)
MCHC RBC AUTO-ENTMCNC: 33.9 G/DL (ref 31.5–36.5)
MCV RBC AUTO: 83 FL (ref 78–100)
MONOCYTES # BLD AUTO: 0.5 10E3/UL (ref 0–1.3)
MONOCYTES NFR BLD AUTO: 6 %
MUCOUS THREADS #/AREA URNS LPF: PRESENT /LPF
NEUTROPHILS # BLD AUTO: 3.8 10E3/UL (ref 1.6–8.3)
NEUTROPHILS NFR BLD AUTO: 41 %
NITRATE UR QL: NEGATIVE
NRBC # BLD AUTO: 0 10E3/UL
NRBC BLD AUTO-RTO: 0 /100
PH UR STRIP: 5.5 [PH] (ref 5–7)
PLAT MORPH BLD: ABNORMAL
PLATELET # BLD AUTO: 275 10E3/UL (ref 150–450)
POTASSIUM SERPL-SCNC: 3 MMOL/L (ref 3.4–5.3)
PROT SERPL-MCNC: 7.5 G/DL (ref 6.4–8.3)
PROTHROMBIN TIME: 13.3 SECONDS (ref 11.8–14.8)
RBC # BLD AUTO: 5.03 10E6/UL (ref 4.4–5.9)
RBC MORPH BLD: ABNORMAL
RBC URINE: <1 /HPF
SODIUM SERPL-SCNC: 141 MMOL/L (ref 135–145)
SP GR UR STRIP: 1.02 (ref 1–1.03)
SQUAMOUS EPITHELIAL: 1 /HPF
UROBILINOGEN UR STRIP-MCNC: <2 MG/DL
VARIANT LYMPHS BLD QL SMEAR: PRESENT
WBC # BLD AUTO: 9.1 10E3/UL (ref 4–11)
WBC URINE: 2 /HPF

## 2025-06-03 PROCEDURE — 99222 1ST HOSP IP/OBS MODERATE 55: CPT | Performed by: STUDENT IN AN ORGANIZED HEALTH CARE EDUCATION/TRAINING PROGRAM

## 2025-06-03 PROCEDURE — 85004 AUTOMATED DIFF WBC COUNT: CPT | Performed by: EMERGENCY MEDICINE

## 2025-06-03 PROCEDURE — 83690 ASSAY OF LIPASE: CPT | Performed by: EMERGENCY MEDICINE

## 2025-06-03 PROCEDURE — 74177 CT ABD & PELVIS W/CONTRAST: CPT

## 2025-06-03 PROCEDURE — 258N000003 HC RX IP 258 OP 636: Performed by: EMERGENCY MEDICINE

## 2025-06-03 PROCEDURE — 85025 COMPLETE CBC W/AUTO DIFF WBC: CPT | Performed by: EMERGENCY MEDICINE

## 2025-06-03 PROCEDURE — 250N000013 HC RX MED GY IP 250 OP 250 PS 637: Performed by: EMERGENCY MEDICINE

## 2025-06-03 PROCEDURE — 36415 COLL VENOUS BLD VENIPUNCTURE: CPT | Performed by: EMERGENCY MEDICINE

## 2025-06-03 PROCEDURE — 250N000011 HC RX IP 250 OP 636: Mod: JZ | Performed by: EMERGENCY MEDICINE

## 2025-06-03 PROCEDURE — 96361 HYDRATE IV INFUSION ADD-ON: CPT

## 2025-06-03 PROCEDURE — 96374 THER/PROPH/DIAG INJ IV PUSH: CPT

## 2025-06-03 PROCEDURE — 96376 TX/PRO/DX INJ SAME DRUG ADON: CPT

## 2025-06-03 PROCEDURE — 84132 ASSAY OF SERUM POTASSIUM: CPT | Performed by: STUDENT IN AN ORGANIZED HEALTH CARE EDUCATION/TRAINING PROGRAM

## 2025-06-03 PROCEDURE — G0378 HOSPITAL OBSERVATION PER HR: HCPCS

## 2025-06-03 PROCEDURE — 82310 ASSAY OF CALCIUM: CPT | Performed by: EMERGENCY MEDICINE

## 2025-06-03 PROCEDURE — 82077 ASSAY SPEC XCP UR&BREATH IA: CPT | Performed by: EMERGENCY MEDICINE

## 2025-06-03 PROCEDURE — 85610 PROTHROMBIN TIME: CPT | Performed by: EMERGENCY MEDICINE

## 2025-06-03 PROCEDURE — 99285 EMERGENCY DEPT VISIT HI MDM: CPT | Mod: 25

## 2025-06-03 PROCEDURE — 250N000013 HC RX MED GY IP 250 OP 250 PS 637: Performed by: STUDENT IN AN ORGANIZED HEALTH CARE EDUCATION/TRAINING PROGRAM

## 2025-06-03 PROCEDURE — 250N000011 HC RX IP 250 OP 636: Performed by: STUDENT IN AN ORGANIZED HEALTH CARE EDUCATION/TRAINING PROGRAM

## 2025-06-03 PROCEDURE — 250N000011 HC RX IP 250 OP 636: Performed by: EMERGENCY MEDICINE

## 2025-06-03 PROCEDURE — 36415 COLL VENOUS BLD VENIPUNCTURE: CPT | Performed by: STUDENT IN AN ORGANIZED HEALTH CARE EDUCATION/TRAINING PROGRAM

## 2025-06-03 PROCEDURE — 96375 TX/PRO/DX INJ NEW DRUG ADDON: CPT

## 2025-06-03 PROCEDURE — 80053 COMPREHEN METABOLIC PANEL: CPT | Performed by: EMERGENCY MEDICINE

## 2025-06-03 PROCEDURE — 81001 URINALYSIS AUTO W/SCOPE: CPT | Performed by: EMERGENCY MEDICINE

## 2025-06-03 RX ORDER — NALOXONE HYDROCHLORIDE 0.4 MG/ML
0.2 INJECTION, SOLUTION INTRAMUSCULAR; INTRAVENOUS; SUBCUTANEOUS
Status: DISCONTINUED | OUTPATIENT
Start: 2025-06-03 | End: 2025-06-04 | Stop reason: HOSPADM

## 2025-06-03 RX ORDER — ACETAMINOPHEN 325 MG/1
650 TABLET ORAL EVERY 4 HOURS PRN
Status: DISCONTINUED | OUTPATIENT
Start: 2025-06-03 | End: 2025-06-04 | Stop reason: HOSPADM

## 2025-06-03 RX ORDER — HYDROMORPHONE HYDROCHLORIDE 1 MG/ML
0.5 INJECTION, SOLUTION INTRAMUSCULAR; INTRAVENOUS; SUBCUTANEOUS ONCE
Refills: 0 | Status: COMPLETED | OUTPATIENT
Start: 2025-06-03 | End: 2025-06-03

## 2025-06-03 RX ORDER — AMOXICILLIN 250 MG
2 CAPSULE ORAL 2 TIMES DAILY PRN
Status: DISCONTINUED | OUTPATIENT
Start: 2025-06-03 | End: 2025-06-04 | Stop reason: HOSPADM

## 2025-06-03 RX ORDER — IOPAMIDOL 755 MG/ML
90 INJECTION, SOLUTION INTRAVASCULAR ONCE
Status: COMPLETED | OUTPATIENT
Start: 2025-06-03 | End: 2025-06-03

## 2025-06-03 RX ORDER — ONDANSETRON 4 MG/1
4 TABLET, ORALLY DISINTEGRATING ORAL EVERY 6 HOURS PRN
Status: DISCONTINUED | OUTPATIENT
Start: 2025-06-03 | End: 2025-06-04 | Stop reason: HOSPADM

## 2025-06-03 RX ORDER — MAGNESIUM HYDROXIDE/ALUMINUM HYDROXICE/SIMETHICONE 120; 1200; 1200 MG/30ML; MG/30ML; MG/30ML
15 SUSPENSION ORAL 3 TIMES DAILY PRN
Status: DISCONTINUED | OUTPATIENT
Start: 2025-06-03 | End: 2025-06-04

## 2025-06-03 RX ORDER — ONDANSETRON 2 MG/ML
4 INJECTION INTRAMUSCULAR; INTRAVENOUS ONCE
Status: COMPLETED | OUTPATIENT
Start: 2025-06-03 | End: 2025-06-03

## 2025-06-03 RX ORDER — POTASSIUM CHLORIDE 1.5 G/1.58G
40 POWDER, FOR SOLUTION ORAL ONCE
Status: COMPLETED | OUTPATIENT
Start: 2025-06-03 | End: 2025-06-03

## 2025-06-03 RX ORDER — PANTOPRAZOLE SODIUM 40 MG/1
40 TABLET, DELAYED RELEASE ORAL
Status: DISCONTINUED | OUTPATIENT
Start: 2025-06-04 | End: 2025-06-04

## 2025-06-03 RX ORDER — AMOXICILLIN 250 MG
1 CAPSULE ORAL 2 TIMES DAILY PRN
Status: DISCONTINUED | OUTPATIENT
Start: 2025-06-03 | End: 2025-06-04 | Stop reason: HOSPADM

## 2025-06-03 RX ORDER — ACETAMINOPHEN 650 MG/1
650 SUPPOSITORY RECTAL EVERY 4 HOURS PRN
Status: DISCONTINUED | OUTPATIENT
Start: 2025-06-03 | End: 2025-06-04 | Stop reason: HOSPADM

## 2025-06-03 RX ORDER — ONDANSETRON 2 MG/ML
4 INJECTION INTRAMUSCULAR; INTRAVENOUS EVERY 6 HOURS PRN
Status: DISCONTINUED | OUTPATIENT
Start: 2025-06-03 | End: 2025-06-04 | Stop reason: HOSPADM

## 2025-06-03 RX ORDER — POTASSIUM CHLORIDE 1500 MG/1
40 TABLET, EXTENDED RELEASE ORAL ONCE
Status: COMPLETED | OUTPATIENT
Start: 2025-06-03 | End: 2025-06-03

## 2025-06-03 RX ORDER — DIPHENHYDRAMINE HYDROCHLORIDE 50 MG/ML
25 INJECTION, SOLUTION INTRAMUSCULAR; INTRAVENOUS ONCE
Status: COMPLETED | OUTPATIENT
Start: 2025-06-03 | End: 2025-06-03

## 2025-06-03 RX ORDER — POTASSIUM CHLORIDE 1.5 G/1.58G
20 POWDER, FOR SOLUTION ORAL ONCE
Status: COMPLETED | OUTPATIENT
Start: 2025-06-03 | End: 2025-06-03

## 2025-06-03 RX ORDER — NALOXONE HYDROCHLORIDE 0.4 MG/ML
0.4 INJECTION, SOLUTION INTRAMUSCULAR; INTRAVENOUS; SUBCUTANEOUS
Status: DISCONTINUED | OUTPATIENT
Start: 2025-06-03 | End: 2025-06-04 | Stop reason: HOSPADM

## 2025-06-03 RX ORDER — PROCHLORPERAZINE MALEATE 10 MG
10 TABLET ORAL EVERY 6 HOURS PRN
Status: DISCONTINUED | OUTPATIENT
Start: 2025-06-03 | End: 2025-06-04 | Stop reason: HOSPADM

## 2025-06-03 RX ORDER — OXYCODONE HYDROCHLORIDE 5 MG/1
5 TABLET ORAL ONCE
Refills: 0 | Status: DISCONTINUED | OUTPATIENT
Start: 2025-06-03 | End: 2025-06-03

## 2025-06-03 RX ORDER — MAGNESIUM HYDROXIDE/ALUMINUM HYDROXICE/SIMETHICONE 120; 1200; 1200 MG/30ML; MG/30ML; MG/30ML
30 SUSPENSION ORAL ONCE
Status: COMPLETED | OUTPATIENT
Start: 2025-06-03 | End: 2025-06-03

## 2025-06-03 RX ADMIN — IOPAMIDOL 90 ML: 755 INJECTION, SOLUTION INTRAVENOUS at 06:46

## 2025-06-03 RX ADMIN — ONDANSETRON 4 MG: 2 INJECTION, SOLUTION INTRAMUSCULAR; INTRAVENOUS at 10:23

## 2025-06-03 RX ADMIN — SODIUM CHLORIDE 1000 ML: 0.9 INJECTION, SOLUTION INTRAVENOUS at 06:23

## 2025-06-03 RX ADMIN — HYDROMORPHONE HYDROCHLORIDE 0.5 MG: 1 INJECTION, SOLUTION INTRAMUSCULAR; INTRAVENOUS; SUBCUTANEOUS at 14:18

## 2025-06-03 RX ADMIN — ONDANSETRON 4 MG: 4 TABLET, ORALLY DISINTEGRATING ORAL at 17:02

## 2025-06-03 RX ADMIN — HYDROMORPHONE HYDROCHLORIDE 0.5 MG: 1 INJECTION, SOLUTION INTRAMUSCULAR; INTRAVENOUS; SUBCUTANEOUS at 10:23

## 2025-06-03 RX ADMIN — ALUMINUM HYDROXIDE, MAGNESIUM HYDROXIDE, AND DIMETHICONE 30 ML: 200; 20; 200 SUSPENSION ORAL at 08:55

## 2025-06-03 RX ADMIN — DIPHENHYDRAMINE HYDROCHLORIDE 25 MG: 50 INJECTION INTRAMUSCULAR; INTRAVENOUS at 09:39

## 2025-06-03 RX ADMIN — POTASSIUM CHLORIDE 20 MEQ: 1.5 POWDER, FOR SOLUTION ORAL at 18:22

## 2025-06-03 RX ADMIN — PROCHLORPERAZINE EDISYLATE 10 MG: 5 INJECTION INTRAMUSCULAR; INTRAVENOUS at 08:07

## 2025-06-03 RX ADMIN — POTASSIUM CHLORIDE 40 MEQ: 1500 TABLET, EXTENDED RELEASE ORAL at 08:54

## 2025-06-03 RX ADMIN — OXYCODONE HYDROCHLORIDE 2.5 MG: 5 TABLET ORAL at 20:12

## 2025-06-03 RX ADMIN — HYDROMORPHONE HYDROCHLORIDE 1 MG: 1 INJECTION, SOLUTION INTRAMUSCULAR; INTRAVENOUS; SUBCUTANEOUS at 06:02

## 2025-06-03 RX ADMIN — POTASSIUM CHLORIDE 40 MEQ: 1.5 POWDER, FOR SOLUTION ORAL at 17:02

## 2025-06-03 RX ADMIN — PANTOPRAZOLE SODIUM 40 MG: 40 INJECTION, POWDER, LYOPHILIZED, FOR SOLUTION INTRAVENOUS at 08:07

## 2025-06-03 ASSESSMENT — ACTIVITIES OF DAILY LIVING (ADL)
ADLS_ACUITY_SCORE: 55
ADLS_ACUITY_SCORE: 55
ADLS_ACUITY_SCORE: 49
ADLS_ACUITY_SCORE: 49
ADLS_ACUITY_SCORE: 55
ADLS_ACUITY_SCORE: 49
ADLS_ACUITY_SCORE: 55
ADLS_ACUITY_SCORE: 49
ADLS_ACUITY_SCORE: 55
ADLS_ACUITY_SCORE: 49
ADLS_ACUITY_SCORE: 55
ADLS_ACUITY_SCORE: 49
ADLS_ACUITY_SCORE: 55

## 2025-06-03 ASSESSMENT — COLUMBIA-SUICIDE SEVERITY RATING SCALE - C-SSRS
2. HAVE YOU ACTUALLY HAD ANY THOUGHTS OF KILLING YOURSELF IN THE PAST MONTH?: NO
1. IN THE PAST MONTH, HAVE YOU WISHED YOU WERE DEAD OR WISHED YOU COULD GO TO SLEEP AND NOT WAKE UP?: NO
6. HAVE YOU EVER DONE ANYTHING, STARTED TO DO ANYTHING, OR PREPARED TO DO ANYTHING TO END YOUR LIFE?: NO

## 2025-06-03 NOTE — ED NOTES
"[facility] ED Handoff Report    ED Chief Complaint: Abdominal pain    ED Diagnosis:  (R10.13) Epigastric pain  Comment: Previous gallbladder surgery  Plan: Observation    PMH:  No past medical history on file.     Code Status:  Prior     Falls Risk: No Band: Not applicable    Current Living Situation/Residence: lives in an apartment     Elimination Status: Continent: Yes     Activity Level: Independent    Patient's Preferred Language:  English     Needed: No    Vital Signs:  /58   Pulse 59   Temp 97.8  F (36.6  C)   Resp 18   Ht 1.854 m (6' 1\")   Wt 88.5 kg (195 lb)   SpO2 98%   BMI 25.73 kg/m       Cardiac Rhythm: N/A    Pain Score: 8/10    Is the Patient Confused:  No    Last Food or Drink: 06/03/25 at 1430, had water    Assessment and Plan of Care:  Patient admitted for epigastric pain, nausea, and multiple episodes of vomiting. History of gallbladder removal and pancreatitis. Patient reports drinking heavily this weekend but usually refrains due to GI history. He is A&O x4, independent, calls appropriately, and VSS.    Tests Performed: Done: Labs and Imaging    Treatments Provided:  Pain medications    Family Dynamics/Concerns: No    Belongings Checklist Done and Signed by Patient: N/A    Belongings Sent with Patient: Yes    Boarding medications sent with patient: N/A    Additional Information:     RN: Abdirahman Hand RN  6/3/2025 3:42 PM       "

## 2025-06-03 NOTE — ED PROVIDER NOTES
EMERGENCY DEPARTMENT ENCOUNTER      NAME: Tylan D Severson  AGE: 24 year old male  YOB: 2000  MRN: [id number]  EVALUATION DATE & TIME: 6/3/2025  5:48 AM    PCP: No Ref-Primary, Physician    ED PROVIDER: Efren Frye M.D.      Chief Complaint   Patient presents with    Abdominal Pain         FINAL IMPRESSION:  1. Epigastric pain          ED COURSE & MEDICAL DECISION MAKIN year old male presents to the Emergency Department for evaluation of epigastric pain.  Patient arrives to the emergency department vitally stable.  He has a history of a couple of admissions this year for pancreatitis.  History of alcohol abuse.  Had a cholecystectomy a few weeks ago.  Presenting with acute upper abdominal pain overnight.  He has tenderness across the upper abdomen including in the right upper quadrant.  He underwent lab and imaging evaluation.  Labs predominantly stable but notable for some mild hypokalemia.  He also has a slight increase in his bilirubin and liver function test.  He does admit to drinking little more heavily couple of days ago which was his brother's birthday.  His CT shows slight increase in extrahepatic biliary dilatation possibly reservoir effect.  No intrahepatic dilation was noted.  Patient's lipase is normal.  No other acute intra-abdominal process was seen.  Patient continued to have some fairly significant abdominal pain and nausea throughout the first few hours of his emergency department evaluation.  He was unable to drink more than a few sips of water while here.  I do think his liver function test and bilirubin elevation are more likely alcohol induced would benefit from trending.  We discussed continuing to observe him in the hospital for ongoing epigastric pain vomiting and somewhat abnormal liver function tests.  Case reviewed with hospitalist.    At the conclusion of the encounter I discussed the results of all of the tests and the disposition. The questions were  answered. The patient or family acknowledged understanding and was agreeable with the care plan.     6:02 AM I met with the patient, obtained history, performed an initial exam, and discussed options and plan for diagnostics and treatment here in the ED.   9:45 AM I spoke with Dr. Mina (hospitalist team), who accepts the patient for admission.     Medical Decision Making    Admit.    MIPS (CTPE, Dental pain, Hernandez, Sinusitis, Asthma/COPD, Head Trauma): Not Applicable    SEPSIS: None            MEDICATIONS GIVEN IN THE EMERGENCY:  Medications   HYDROmorphone (DILAUDID) injection 1 mg (1 mg Intravenous $Given 6/3/25 0602)   prochlorperazine (COMPAZINE) injection 10 mg (10 mg Intravenous $Given 6/3/25 0807)   sodium chloride 0.9% BOLUS 1,000 mL (0 mLs Intravenous Stopped 6/3/25 0738)   iopamidol (ISOVUE-370) solution 90 mL (90 mLs Intravenous $Given 6/3/25 0646)   potassium chloride agustin ER (KLOR-CON M20) CR tablet 40 mEq (40 mEq Oral $Given 6/3/25 0854)   pantoprazole (PROTONIX) IV push injection 40 mg (40 mg Intravenous $Given 6/3/25 0807)   alum & mag hydroxide-simethicone (MAALOX) suspension 30 mL (30 mLs Oral $Given 6/3/25 0855)   diphenhydrAMINE (BENADRYL) injection 25 mg (25 mg Intravenous $Given 6/3/25 0939)   ondansetron (ZOFRAN) injection 4 mg (4 mg Intravenous $Given 6/3/25 1023)   HYDROmorphone (PF) (DILAUDID) injection 0.5 mg (0.5 mg Intravenous $Given 6/3/25 1023)       NEW PRESCRIPTIONS STARTED AT TODAY'S ER VISIT  New Prescriptions    No medications on file          =================================================================    HPI    Patient information was obtained from: the patient    Use of : N/A         Deny D Severson is a 24 year old male with a pertinent history of acute pancreatitis, acute cholecystitis and s/p cholecystectomy (5/8/2025), who presents to this ED for evaluation of abdominal pain.     The patient underwent a recent cholecystectomy and has a history of acute  "flare-ups of pancreatitis. His last flare-up occurred in February of 2025.     The patient went to bed last night (6/2/2025) feeling okay. Throughout the night, he noticed that he was unable to urinate. This morning (06/03/25) at around 5:30 AM, he woke up with \"excruciating\" back pain that is worse with standing. Later on, he developed bilateral epigastric abdominal pain that feels somewhat similar to past pancreatitis flare-ups. The abdominal pain is exacerbated by lying down. He has had two episodes of emesis since this morning. The patient did have some alcohol at a birthday party several days ago, but has otherwise kept his alcohol consumption to a minimum. He notes that he does not have insurance.     Per Chart Review, the patient was admitted to [facility] from 5/8/2025-5/9/2025 with acute cholecystitis. He underwent a cholecystectomy without complication. He had been seen the month prior in the emergency department for abdominal pain, and was diagnosed with pancreatitis. Planned for follow-up with primary care. He was prescribed a five-day course of Augmentin.      REVIEW OF SYSTEMS   All systems reviewed and negative except as noted in HPI.    PAST MEDICAL HISTORY:  No past medical history on file.    PAST SURGICAL HISTORY:  No past surgical history on file.        CURRENT MEDICATIONS:    No current facility-administered medications for this encounter.     Current Outpatient Medications   Medication Sig Dispense Refill    FIBER ADULT GUMMIES PO Take 1 tablet by mouth every morning. Take 1 gummy po qam after meals           ALLERGIES:  No Known Allergies    FAMILY HISTORY:  No family history on file.    SOCIAL HISTORY:   Social History     Socioeconomic History    Marital status: Single   Tobacco Use    Smoking status: Never    Smokeless tobacco: Never   Substance and Sexual Activity    Alcohol use: Not Currently    Drug use: Not Currently     Social Drivers of Health     Financial Resource Strain: " "Low Risk  (5/8/2025)    Financial Resource Strain     Within the past 12 months, have you or your family members you live with been unable to get utilities (heat, electricity) when it was really needed?: No   Food Insecurity: High Risk (5/8/2025)    Food Insecurity     Within the past 12 months, did you worry that your food would run out before you got money to buy more?: Yes     Within the past 12 months, did the food you bought just not last and you didn t have money to get more?: Yes   Transportation Needs: High Risk (5/8/2025)    Transportation Needs     Within the past 12 months, has lack of transportation kept you from medical appointments, getting your medicines, non-medical meetings or appointments, work, or from getting things that you need?: Yes    [facility]    Social Connections   Interpersonal Safety: Low Risk  (5/8/2025)    Interpersonal Safety     Do you feel physically and emotionally safe where you currently live?: Yes     Within the past 12 months, have you been hit, slapped, kicked or otherwise physically hurt by someone?: No     Within the past 12 months, have you been humiliated or emotionally abused in other ways by your partner or ex-partner?: No   Housing Stability: Low Risk  (5/8/2025)    Housing Stability     Do you have housing? : Yes     Are you worried about losing your housing?: No       VITALS:  BP (!) 158/63   Pulse 62   Temp 97.8  F (36.6  C)   Resp 18   Ht 1.854 m (6' 1\")   Wt 88.5 kg (195 lb)   SpO2 100%   BMI 25.73 kg/m      PHYSICAL EXAM    Constitutional: Well developed, Well nourished, NAD.  HENT: Normocephalic, Atraumatic. Neck Supple.  Eyes: EOMI, Conjunctiva normal.  Respiratory: Breathing comfortably on room air. Speaks full sentences easily. Lungs clear to ascultation.  Cardiovascular: Normal heart rate, Regular rhythm. No peripheral edema.  Abdomen: Soft, tenderness across the upper abdomen  Musculoskeletal: Good " range of motion in all major joints. No major deformities noted.  Integument: Warm, Dry.  Neurologic: Alert & awake, Normal motor function, Normal sensory function, No focal deficits noted.   Psychiatric: Cooperative. Affect appropriate.     LAB:  All pertinent labs reviewed and interpreted.  Labs Ordered and Resulted from Time of ED Arrival to Time of ED Departure   COMPREHENSIVE METABOLIC PANEL - Abnormal       Result Value    Sodium 141      Potassium 3.0 (*)     Carbon Dioxide (CO2) 22      Anion Gap 17 (*)     Urea Nitrogen 8.5      Creatinine 0.82      GFR Estimate >90      Calcium 9.8      Chloride 102      Glucose 147 (*)     Alkaline Phosphatase 109       (*)      (*)     Protein Total 7.5      Albumin 4.5      Bilirubin Total 1.4 (*)    ROUTINE UA WITH MICROSCOPIC REFLEX TO CULTURE - Abnormal    Color Urine Yellow      Appearance Urine Clear      Glucose Urine Negative      Bilirubin Urine Negative      Ketones Urine Negative      Specific Gravity Urine 1.025      Blood Urine Negative      pH Urine 5.5      Protein Albumin Urine 10 (*)     Urobilinogen Urine <2.0      Nitrite Urine Negative      Leukocyte Esterase Urine Negative      Mucus Urine Present (*)     RBC Urine <1      WBC Urine 2      Squamous Epithelials Urine 1     RBC AND PLATELET MORPHOLOGY - Abnormal    RBC Morphology Confirmed RBC Indices      Platelet Assessment        Value: Automated Count Confirmed. Platelet morphology is normal.    Reactive Lymphocytes Present (*)    LIPASE - Normal    Lipase 23     ETHANOL LEVEL BLOOD - Normal    Ethanol Level Blood <0.01     INR - Normal    INR 0.99      PT 13.3     CBC WITH PLATELETS AND DIFFERENTIAL    WBC Count 9.1      RBC Count 5.03      Hemoglobin 14.2      Hematocrit 41.9      MCV 83      MCH 28.2      MCHC 33.9      RDW 12.7      Platelet Count 275      % Neutrophils 41      % Lymphocytes 49      % Monocytes 6      % Eosinophils 3      % Basophils 0      % Immature Granulocytes  0      NRBCs per 100 WBC 0      Absolute Neutrophils 3.8      Absolute Lymphocytes 4.5      Absolute Monocytes 0.5      Absolute Eosinophils 0.3      Absolute Basophils 0.0      Absolute Immature Granulocytes 0.0      Absolute NRBCs 0.0         RADIOLOGY:  Reviewed all pertinent imaging. Please see official radiology report.  CT Abdomen Pelvis w Contrast   Final Result   IMPRESSION:    1.  No acute inflammatory process in the abdomen or pelvis.   2.  Extrahepatic bile duct enlargement, slightly greater then prior status post cholecystectomy, favored for reservoir effect. No wall inflammation or ductal calculi identified.   3.  Minimal sigmoid diverticulosis. Normal appendix.            I, Anais Batista, am serving as a scribe to document services personally performed by Dr. Efren Frye, based on my observation and the provider's statements to me. I, Efren Frye MD attest that Anais Batista is acting in a scribe capacity, has observed my performance of the services and has documented them in accordance with my direction.    Efren Frye M.D.  Emergency Medicine  [facility] EMERGENCY ROOM  [address] [id number] [phone]  Dept: [phone]       Efren Frye MD  06/03/25 6038

## 2025-06-03 NOTE — ED NOTES
Bed: [facility]-11  Expected date: 6/3/25  Expected time: 5:43 AM  Means of arrival: Ambulance  Comments:  EMS: Bradly  Age/gender: 27M  CC: Abd pain

## 2025-06-03 NOTE — PHARMACY-ADMISSION MEDICATION HISTORY
Pharmacy Intern Admission Medication History    Admission medication history is complete. The information provided in this note is only as accurate as the sources available at the time of the update.    Information Source(s): Patient and CareEverywhere/SureScripts via in-person    Pertinent Information: Patient states the only medication he uses is fiber gummies whenever he remembers to take them but is not consistently taking them    Changes made to PTA medication list:  Added: none  Deleted: apap  Changed: fiber gummy    Allergies reviewed with patient and updates made in EHR: yes    Medications available for use during hospital stay: None    Medication History Completed By: Sendy Mosqueda 6/3/2025 9:46 AM    PTA Med List   Medication Sig Last Dose/Taking    FIBER ADULT GUMMIES PO Take 1 tablet by mouth every morning. Take 1 gummy po qam after meals Past Week

## 2025-06-03 NOTE — H&P
"[facility]    History and Physical - Hospitalist Service       Date of Admission:  6/3/2025    Assessment & Plan      Tylan D Severson is a 24 year old male admitted on 6/3/2025.  He has a past medical history significant for alcohol use disorder, alcoholic pancreatitis, cholecystitis status post cholecystectomy in May 2025 who presented to the hospital with epigastric pain.    Abdominal pain  Nausea and vomiting  CT abdomen pelvis extrahepatic bile duct enlargement, slightly greater then prior status post cholecystectomy, favored for reservoir effect. No wall inflammation or ductal calculi identified.  No signs of pancreatitis.  Lipase negative.  Mild transaminitis, however, normal alkaline phosphate.  Very mildly elevated total bili John.  Symptoms are possibly related to gastritis or mild alcoholic hepatitis due to alcohol abuse.  Less likely biliary duct stone.    Plan  GI cocktail  Pantoprazole 40 mg daily.  Recheck CMP on 6/4  If recurrent abdominal pain-> consult gastroenterology  Tylenol as needed, oxycodone as needed.  Bladder scan    Hypokalemia    Plan  Replete as per protocol    Alcohol use disorder  Last alcoholic drink more than 3 days ago.  No signs of alcohol withdrawal.    Plan  Educated about the importance of complete alcohol cessation            Diet:  Advance as tolerated  DVT Prophylaxis: Low Risk/Ambulatory with no VTE prophylaxis indicated  Hernandez Catheter: Not present  Lines: None     Cardiac Monitoring: None  Code Status:  Full code    Clinically Significant Risk Factors Present on Admission        # Hypokalemia: Lowest K = 3 mmol/L in last 2 days, will replace as needed                      # Overweight: Estimated body mass index is 25.73 kg/m  as calculated from the following:    Height as of this encounter: 1.854 m (6' 1\").    Weight as of this encounter: 88.5 kg (195 lb).              Disposition Plan     Medically Ready for Discharge: Anticipated " Tomorrow           ANDRES VANCE MD  Hospitalist Service  [facility]  Securely message with [employer] (more info)  Text page via [employer] Paging/Directory     ______________________________________________________________________    Chief Complaint   Epigastric pain    History is obtained from the patient    History of Present Illness   Tylan D Severson is a 24 year old male admitted on 6/3/2025.  He has a past medical history significant for alcohol use disorder, alcoholic pancreatitis, cholecystitis status post cholecystectomy in May 2025 who presented to the hospital with epigastric pain.    Patient was in his usual state of health until 6/3/2025.  He woke up this morning and started to feel sharp epigastric pain radiating to his back associated with nausea and vomiting.  Possible fever.  Denies any chest pressure or shortness of breath.  Reported some loose stool, had some difficulty with urination this morning, however, denies any burning with urination.    Reported history of heavy alcohol use in the past, however, he stopped after his cholecystectomy a few weeks ago.  Patient had a relapse a few days ago while he was celebrating his brother's birthday and had a few alcoholic drinks.  Reported history of marijuana use.          Past Medical History    No past medical history on file.    Past Surgical History   No past surgical history on file.    Prior to Admission Medications   Prior to Admission Medications   Prescriptions Last Dose Informant Patient Reported? Taking?   FIBER ADULT GUMMIES PO Past Week  Yes Yes   Sig: Take 1 tablet by mouth every morning. Take 1 gummy po qam after meals      Facility-Administered Medications: None          Physical Exam   Vital Signs: Temp: 97.8  F (36.6  C)   BP: 110/58 Pulse: 59   Resp: 18 SpO2: 98 % O2 Device: None (Room air)    Weight: 195 lbs 0 oz    Physical Exam  Constitutional:       General: He is not in acute distress.     Appearance: He is not  ill-appearing or toxic-appearing.   Cardiovascular:      Rate and Rhythm: Normal rate.   Abdominal:      General: Abdomen is flat. Bowel sounds are normal. There is no distension.      Comments: Mild tenderness to palpation of the epigastric area.   Musculoskeletal:      Right lower leg: No edema.      Left lower leg: No edema.   Skin:     General: Skin is warm and dry.   Neurological:      Mental Status: He is alert.   Psychiatric:         Mood and Affect: Mood normal.         Thought Content: Thought content normal.         Judgment: Judgment normal.          Medical Decision Making       55 MINUTES SPENT BY ME on the date of service doing chart review, history, exam, documentation & further activities per the note.      Data     I have personally reviewed the following data over the past 24 hrs:    9.1  \   14.2   / 275     141 102 8.5 /  147 (H)   3.0 (L) 22 0.82 \     ALT: 109 (H) AST: 116 (H) AP: 109 TBILI: 1.4 (H)   ALB: 4.5 TOT PROTEIN: 7.5 LIPASE: 23     INR:  0.99 PTT:  N/A   D-dimer:  N/A Fibrinogen:  N/A       Imaging results reviewed over the past 24 hrs:   Recent Results (from the past 24 hours)   CT Abdomen Pelvis w Contrast    Narrative    EXAM: CT ABDOMEN PELVIS W CONTRAST  LOCATION: [facility]  DATE: 6/3/2025    INDICATION: Epigastric pain, back pain, vomiting  COMPARISON: CT of the abdomen and pelvis 5/8/2025  TECHNIQUE: CT scan of the abdomen and pelvis was performed following injection of IV contrast. Multiplanar reformats were obtained. Dose reduction techniques were used.  CONTRAST: Isovue 370 90ml    FINDINGS:   LOWER CHEST: Normal.    HEPATOBILIARY:  Interval cholecystectomy. The common bile duct and cystic duct remnant are more dilated compared with 5/8/2025, but no visible filling defects or wall thickening. No intrahepatic ductal dilation.. Liver is homogeneous attenuation and has   a smooth capsule.    PANCREAS: Normal.    SPLEEN: Normal.    ADRENAL GLANDS:  Normal.    KIDNEYS/BLADDER: Normal.    BOWEL: Few sigmoid colonic diverticula are present. No dilated bowel or bowel wall thickening. No inflammatory stranding in the mesentery. No peritoneal thickening or ascites. Normal appendix.    LYMPH NODES: Normal.    VASCULATURE: Normal.    PELVIC ORGANS: Prostate gland is normal in size. Seminal vesicles are symmetric.    MUSCULOSKELETAL: Normal.      Impression    IMPRESSION:   1.  No acute inflammatory process in the abdomen or pelvis.  2.  Extrahepatic bile duct enlargement, slightly greater then prior status post cholecystectomy, favored for reservoir effect. No wall inflammation or ductal calculi identified.  3.  Minimal sigmoid diverticulosis. Normal appendix.

## 2025-06-03 NOTE — ED TRIAGE NOTES
Bought in by [facility] from private residence. Presents to ED with sudden onset of abdominal pain, nausea, vomiting at around approx 5:30 am. Pt had removal of gallbladder removed approximately 3 weeks ago. Received 100 mg Fentanyl, 4 mg zofran in the field. Pt tearful on arrival. Provider at bedside     Triage Assessment (Adult)       Row Name 06/03/25 0551          Triage Assessment    Airway WDL WDL        Respiratory WDL    Respiratory WDL WDL        Skin Circulation/Temperature WDL    Skin Circulation/Temperature WDL WDL        Cardiac WDL    Cardiac WDL WDL        Peripheral/Neurovascular WDL    Peripheral Neurovascular WDL WDL        Cognitive/Neuro/Behavioral WDL    Cognitive/Neuro/Behavioral WDL WDL

## 2025-06-04 ENCOUNTER — HOSPITAL ENCOUNTER (INPATIENT)
Facility: HOSPITAL | Age: 25
Setting detail: SURGERY ADMIT
End: 2025-06-04
Attending: INTERNAL MEDICINE | Admitting: INTERNAL MEDICINE

## 2025-06-04 ENCOUNTER — APPOINTMENT (OUTPATIENT)
Dept: MRI IMAGING | Facility: CLINIC | Age: 25
DRG: 446 | End: 2025-06-04
Attending: PHYSICIAN ASSISTANT

## 2025-06-04 ENCOUNTER — HOSPITAL ENCOUNTER (INPATIENT)
Facility: HOSPITAL | Age: 25
End: 2025-06-04
Attending: INTERNAL MEDICINE | Admitting: INTERNAL MEDICINE

## 2025-06-04 VITALS
BODY MASS INDEX: 25.84 KG/M2 | HEIGHT: 73 IN | SYSTOLIC BLOOD PRESSURE: 124 MMHG | TEMPERATURE: 98.4 F | RESPIRATION RATE: 16 BRPM | WEIGHT: 195 LBS | HEART RATE: 88 BPM | OXYGEN SATURATION: 97 % | DIASTOLIC BLOOD PRESSURE: 77 MMHG

## 2025-06-04 DIAGNOSIS — K80.50 CHOLEDOCHOLITHIASIS: Primary | ICD-10-CM

## 2025-06-04 DIAGNOSIS — R74.01 TRANSAMINITIS: ICD-10-CM

## 2025-06-04 DIAGNOSIS — K59.03 DRUG-INDUCED CONSTIPATION: ICD-10-CM

## 2025-06-04 DIAGNOSIS — R10.13 EPIGASTRIC PAIN: ICD-10-CM

## 2025-06-04 LAB
ALBUMIN SERPL BCG-MCNC: 4.2 G/DL (ref 3.5–5.2)
ALP SERPL-CCNC: 214 U/L (ref 40–150)
ALT SERPL W P-5'-P-CCNC: 471 U/L (ref 0–70)
ANION GAP SERPL CALCULATED.3IONS-SCNC: 11 MMOL/L (ref 7–15)
AST SERPL W P-5'-P-CCNC: 691 U/L (ref 0–45)
BILIRUB SERPL-MCNC: 6 MG/DL
BUN SERPL-MCNC: 5.1 MG/DL (ref 6–20)
CALCIUM SERPL-MCNC: 9.7 MG/DL (ref 8.8–10.4)
CHLORIDE SERPL-SCNC: 101 MMOL/L (ref 98–107)
CREAT SERPL-MCNC: 0.87 MG/DL (ref 0.67–1.17)
EGFRCR SERPLBLD CKD-EPI 2021: >90 ML/MIN/1.73M2
GLUCOSE SERPL-MCNC: 102 MG/DL (ref 70–99)
HAV IGM SERPL QL IA: NONREACTIVE
HBV CORE IGM SERPL QL IA: NONREACTIVE
HBV SURFACE AG SERPL QL IA: NONREACTIVE
HCO3 SERPL-SCNC: 26 MMOL/L (ref 22–29)
HCV AB SERPL QL IA: NONREACTIVE
HOLD SPECIMEN: NORMAL
LIPASE SERPL-CCNC: 42 U/L (ref 13–60)
POTASSIUM SERPL-SCNC: 3.9 MMOL/L (ref 3.4–5.3)
POTASSIUM SERPL-SCNC: 3.9 MMOL/L (ref 3.4–5.3)
PROT SERPL-MCNC: 7 G/DL (ref 6.4–8.3)
SODIUM SERPL-SCNC: 138 MMOL/L (ref 135–145)

## 2025-06-04 PROCEDURE — 96375 TX/PRO/DX INJ NEW DRUG ADDON: CPT

## 2025-06-04 PROCEDURE — G0378 HOSPITAL OBSERVATION PER HR: HCPCS

## 2025-06-04 PROCEDURE — 83690 ASSAY OF LIPASE: CPT | Performed by: STUDENT IN AN ORGANIZED HEALTH CARE EDUCATION/TRAINING PROGRAM

## 2025-06-04 PROCEDURE — 82040 ASSAY OF SERUM ALBUMIN: CPT | Performed by: STUDENT IN AN ORGANIZED HEALTH CARE EDUCATION/TRAINING PROGRAM

## 2025-06-04 PROCEDURE — 258N000003 HC RX IP 258 OP 636: Performed by: STUDENT IN AN ORGANIZED HEALTH CARE EDUCATION/TRAINING PROGRAM

## 2025-06-04 PROCEDURE — 82310 ASSAY OF CALCIUM: CPT | Performed by: STUDENT IN AN ORGANIZED HEALTH CARE EDUCATION/TRAINING PROGRAM

## 2025-06-04 PROCEDURE — 99239 HOSP IP/OBS DSCHRG MGMT >30: CPT | Performed by: STUDENT IN AN ORGANIZED HEALTH CARE EDUCATION/TRAINING PROGRAM

## 2025-06-04 PROCEDURE — 80074 ACUTE HEPATITIS PANEL: CPT | Performed by: PHYSICIAN ASSISTANT

## 2025-06-04 PROCEDURE — 120N000001 HC R&B MED SURG/OB

## 2025-06-04 PROCEDURE — 74181 MRI ABDOMEN W/O CONTRAST: CPT

## 2025-06-04 PROCEDURE — 99222 1ST HOSP IP/OBS MODERATE 55: CPT | Mod: AI | Performed by: INTERNAL MEDICINE

## 2025-06-04 PROCEDURE — 250N000011 HC RX IP 250 OP 636: Mod: JZ | Performed by: INTERNAL MEDICINE

## 2025-06-04 PROCEDURE — 250N000011 HC RX IP 250 OP 636: Performed by: PHYSICIAN ASSISTANT

## 2025-06-04 PROCEDURE — 250N000011 HC RX IP 250 OP 636: Performed by: STUDENT IN AN ORGANIZED HEALTH CARE EDUCATION/TRAINING PROGRAM

## 2025-06-04 PROCEDURE — 36415 COLL VENOUS BLD VENIPUNCTURE: CPT | Performed by: STUDENT IN AN ORGANIZED HEALTH CARE EDUCATION/TRAINING PROGRAM

## 2025-06-04 PROCEDURE — 96376 TX/PRO/DX INJ SAME DRUG ADON: CPT

## 2025-06-04 PROCEDURE — 250N000013 HC RX MED GY IP 250 OP 250 PS 637: Performed by: STUDENT IN AN ORGANIZED HEALTH CARE EDUCATION/TRAINING PROGRAM

## 2025-06-04 RX ORDER — HYDROMORPHONE HYDROCHLORIDE 1 MG/ML
0.3 INJECTION, SOLUTION INTRAMUSCULAR; INTRAVENOUS; SUBCUTANEOUS
Status: ACTIVE | OUTPATIENT
Start: 2025-06-04

## 2025-06-04 RX ORDER — NALOXONE HYDROCHLORIDE 0.4 MG/ML
0.2 INJECTION, SOLUTION INTRAMUSCULAR; INTRAVENOUS; SUBCUTANEOUS
Status: ACTIVE | OUTPATIENT
Start: 2025-06-04

## 2025-06-04 RX ORDER — ONDANSETRON 4 MG/1
4 TABLET, ORALLY DISINTEGRATING ORAL EVERY 6 HOURS PRN
Status: CANCELLED | OUTPATIENT
Start: 2025-06-04

## 2025-06-04 RX ORDER — AMOXICILLIN 250 MG
2 CAPSULE ORAL 2 TIMES DAILY PRN
Status: ACTIVE | OUTPATIENT
Start: 2025-06-04

## 2025-06-04 RX ORDER — NALOXONE HYDROCHLORIDE 0.4 MG/ML
0.4 INJECTION, SOLUTION INTRAMUSCULAR; INTRAVENOUS; SUBCUTANEOUS
Status: CANCELLED | OUTPATIENT
Start: 2025-06-04

## 2025-06-04 RX ORDER — ACETAMINOPHEN 650 MG/1
650 SUPPOSITORY RECTAL EVERY 4 HOURS PRN
Status: ACTIVE | OUTPATIENT
Start: 2025-06-04

## 2025-06-04 RX ORDER — HYDROMORPHONE HYDROCHLORIDE 1 MG/ML
0.5 INJECTION, SOLUTION INTRAMUSCULAR; INTRAVENOUS; SUBCUTANEOUS
Status: DISPENSED | OUTPATIENT
Start: 2025-06-04

## 2025-06-04 RX ORDER — PROCHLORPERAZINE MALEATE 10 MG
10 TABLET ORAL EVERY 6 HOURS PRN
Status: CANCELLED | OUTPATIENT
Start: 2025-06-04

## 2025-06-04 RX ORDER — SODIUM CHLORIDE 9 MG/ML
INJECTION, SOLUTION INTRAVENOUS CONTINUOUS
Status: CANCELLED | OUTPATIENT
Start: 2025-06-04

## 2025-06-04 RX ORDER — HYDROMORPHONE HYDROCHLORIDE 1 MG/ML
0.4 INJECTION, SOLUTION INTRAMUSCULAR; INTRAVENOUS; SUBCUTANEOUS
Refills: 0 | Status: CANCELLED | OUTPATIENT
Start: 2025-06-04

## 2025-06-04 RX ORDER — NALOXONE HYDROCHLORIDE 0.4 MG/ML
0.4 INJECTION, SOLUTION INTRAMUSCULAR; INTRAVENOUS; SUBCUTANEOUS
Status: ACTIVE | OUTPATIENT
Start: 2025-06-04

## 2025-06-04 RX ORDER — NALOXONE HYDROCHLORIDE 0.4 MG/ML
0.2 INJECTION, SOLUTION INTRAMUSCULAR; INTRAVENOUS; SUBCUTANEOUS
Status: CANCELLED | OUTPATIENT
Start: 2025-06-04

## 2025-06-04 RX ORDER — AMOXICILLIN 250 MG
1 CAPSULE ORAL 2 TIMES DAILY PRN
Status: ACTIVE | OUTPATIENT
Start: 2025-06-04

## 2025-06-04 RX ORDER — ACETAMINOPHEN 325 MG/1
650 TABLET ORAL EVERY 4 HOURS PRN
Status: DISPENSED | OUTPATIENT
Start: 2025-06-04

## 2025-06-04 RX ORDER — SODIUM CHLORIDE 9 MG/ML
INJECTION, SOLUTION INTRAVENOUS CONTINUOUS
Status: DISCONTINUED | OUTPATIENT
Start: 2025-06-04 | End: 2025-06-04 | Stop reason: HOSPADM

## 2025-06-04 RX ORDER — ACETAMINOPHEN 325 MG/1
650 TABLET ORAL EVERY 4 HOURS PRN
Status: CANCELLED | OUTPATIENT
Start: 2025-06-04

## 2025-06-04 RX ORDER — LIDOCAINE 40 MG/G
CREAM TOPICAL
Status: ACTIVE | OUTPATIENT
Start: 2025-06-04

## 2025-06-04 RX ORDER — PROCHLORPERAZINE MALEATE 10 MG
10 TABLET ORAL EVERY 6 HOURS PRN
Status: DISPENSED | OUTPATIENT
Start: 2025-06-04

## 2025-06-04 RX ORDER — AMOXICILLIN 250 MG
1 CAPSULE ORAL 2 TIMES DAILY PRN
Status: CANCELLED | OUTPATIENT
Start: 2025-06-04

## 2025-06-04 RX ORDER — LIDOCAINE 40 MG/G
CREAM TOPICAL
Status: CANCELLED | OUTPATIENT
Start: 2025-06-04

## 2025-06-04 RX ORDER — MAGNESIUM HYDROXIDE/ALUMINUM HYDROXICE/SIMETHICONE 120; 1200; 1200 MG/30ML; MG/30ML; MG/30ML
15 SUSPENSION ORAL 3 TIMES DAILY PRN
Status: CANCELLED | OUTPATIENT
Start: 2025-06-04

## 2025-06-04 RX ORDER — ACETAMINOPHEN 650 MG/1
650 SUPPOSITORY RECTAL EVERY 4 HOURS PRN
Status: CANCELLED | OUTPATIENT
Start: 2025-06-04

## 2025-06-04 RX ORDER — MAGNESIUM HYDROXIDE/ALUMINUM HYDROXICE/SIMETHICONE 120; 1200; 1200 MG/30ML; MG/30ML; MG/30ML
30 SUSPENSION ORAL EVERY 4 HOURS PRN
Status: ACTIVE | OUTPATIENT
Start: 2025-06-04

## 2025-06-04 RX ORDER — PANTOPRAZOLE SODIUM 40 MG/1
40 TABLET, DELAYED RELEASE ORAL
Status: CANCELLED | OUTPATIENT
Start: 2025-06-05

## 2025-06-04 RX ORDER — ONDANSETRON 4 MG/1
4 TABLET, ORALLY DISINTEGRATING ORAL EVERY 6 HOURS PRN
Status: DISPENSED | OUTPATIENT
Start: 2025-06-04

## 2025-06-04 RX ORDER — PIPERACILLIN SODIUM, TAZOBACTAM SODIUM 4; .5 G/20ML; G/20ML
4.5 INJECTION, POWDER, LYOPHILIZED, FOR SOLUTION INTRAVENOUS EVERY 6 HOURS
Status: CANCELLED | OUTPATIENT
Start: 2025-06-04

## 2025-06-04 RX ORDER — ONDANSETRON 2 MG/ML
4 INJECTION INTRAMUSCULAR; INTRAVENOUS EVERY 6 HOURS PRN
Status: CANCELLED | OUTPATIENT
Start: 2025-06-04

## 2025-06-04 RX ORDER — ONDANSETRON 2 MG/ML
4 INJECTION INTRAMUSCULAR; INTRAVENOUS EVERY 6 HOURS PRN
Status: DISPENSED | OUTPATIENT
Start: 2025-06-04

## 2025-06-04 RX ORDER — AMOXICILLIN 250 MG
2 CAPSULE ORAL 2 TIMES DAILY PRN
Status: CANCELLED | OUTPATIENT
Start: 2025-06-04

## 2025-06-04 RX ORDER — PIPERACILLIN SODIUM, TAZOBACTAM SODIUM 4; .5 G/20ML; G/20ML
4.5 INJECTION, POWDER, LYOPHILIZED, FOR SOLUTION INTRAVENOUS EVERY 6 HOURS
Status: DISCONTINUED | OUTPATIENT
Start: 2025-06-04 | End: 2025-06-04 | Stop reason: HOSPADM

## 2025-06-04 RX ORDER — OXYCODONE HYDROCHLORIDE 5 MG/1
5 TABLET ORAL EVERY 4 HOURS PRN
Refills: 0 | Status: DISPENSED | OUTPATIENT
Start: 2025-06-04

## 2025-06-04 RX ORDER — HYDROMORPHONE HYDROCHLORIDE 1 MG/ML
0.4 INJECTION, SOLUTION INTRAMUSCULAR; INTRAVENOUS; SUBCUTANEOUS
Refills: 0 | Status: DISCONTINUED | OUTPATIENT
Start: 2025-06-04 | End: 2025-06-04 | Stop reason: HOSPADM

## 2025-06-04 RX ADMIN — OXYCODONE HYDROCHLORIDE 2.5 MG: 5 TABLET ORAL at 00:12

## 2025-06-04 RX ADMIN — PIPERACILLIN AND TAZOBACTAM 4.5 G: 4; .5 INJECTION, POWDER, FOR SOLUTION INTRAVENOUS at 14:03

## 2025-06-04 RX ADMIN — OXYCODONE HYDROCHLORIDE 2.5 MG: 5 TABLET ORAL at 09:34

## 2025-06-04 RX ADMIN — HYDROMORPHONE HYDROCHLORIDE 0.4 MG: 1 INJECTION, SOLUTION INTRAMUSCULAR; INTRAVENOUS; SUBCUTANEOUS at 13:46

## 2025-06-04 RX ADMIN — ONDANSETRON 4 MG: 2 INJECTION, SOLUTION INTRAMUSCULAR; INTRAVENOUS at 22:04

## 2025-06-04 RX ADMIN — PANTOPRAZOLE SODIUM 40 MG: 40 TABLET, DELAYED RELEASE ORAL at 06:38

## 2025-06-04 RX ADMIN — OXYCODONE HYDROCHLORIDE 2.5 MG: 5 TABLET ORAL at 04:19

## 2025-06-04 RX ADMIN — OXYCODONE HYDROCHLORIDE 2.5 MG: 5 TABLET ORAL at 19:45

## 2025-06-04 RX ADMIN — SODIUM CHLORIDE: 0.9 INJECTION, SOLUTION INTRAVENOUS at 14:39

## 2025-06-04 RX ADMIN — HYDROMORPHONE HYDROCHLORIDE 0.5 MG: 1 INJECTION, SOLUTION INTRAMUSCULAR; INTRAVENOUS; SUBCUTANEOUS at 22:04

## 2025-06-04 RX ADMIN — OXYCODONE HYDROCHLORIDE 2.5 MG: 5 TABLET ORAL at 13:29

## 2025-06-04 RX ADMIN — PIPERACILLIN AND TAZOBACTAM 4.5 G: 4; .5 INJECTION, POWDER, FOR SOLUTION INTRAVENOUS at 19:37

## 2025-06-04 ASSESSMENT — ACTIVITIES OF DAILY LIVING (ADL)
TOILETING_ISSUES: NO
ADLS_ACUITY_SCORE: 49
DIFFICULTY_EATING/SWALLOWING: NO
WALKING_OR_CLIMBING_STAIRS_DIFFICULTY: NO
ADLS_ACUITY_SCORE: 49
FALL_HISTORY_WITHIN_LAST_SIX_MONTHS: NO
ADLS_ACUITY_SCORE: 29
DOING_ERRANDS_INDEPENDENTLY_DIFFICULTY: NO
DIFFICULTY_COMMUNICATING: NO
ADLS_ACUITY_SCORE: 49
ADLS_ACUITY_SCORE: 29
ADLS_ACUITY_SCORE: 49
CONCENTRATING,_REMEMBERING_OR_MAKING_DECISIONS_DIFFICULTY: NO
CHANGE_IN_FUNCTIONAL_STATUS_SINCE_ONSET_OF_CURRENT_ILLNESS/INJURY: NO
WEAR_GLASSES_OR_BLIND: NO
ADLS_ACUITY_SCORE: 49
ADLS_ACUITY_SCORE: 49
DIFFICULTY_COMMUNICATING: NO
ADLS_ACUITY_SCORE: 49
EQUIPMENT_CURRENTLY_USED_AT_HOME: CANE, QUAD
ADLS_ACUITY_SCORE: 29
ADLS_ACUITY_SCORE: 49
WALKING_OR_CLIMBING_STAIRS_DIFFICULTY: NO
ADLS_ACUITY_SCORE: 49
CONCENTRATING,_REMEMBERING_OR_MAKING_DECISIONS_DIFFICULTY: NO
HEARING_DIFFICULTY_OR_DEAF: NO
ADLS_ACUITY_SCORE: 55
FALL_HISTORY_WITHIN_LAST_SIX_MONTHS: NO
ADLS_ACUITY_SCORE: 29
ADLS_ACUITY_SCORE: 55
WEAR_GLASSES_OR_BLIND: NO
ADLS_ACUITY_SCORE: 49
ADLS_ACUITY_SCORE: 49
TOILETING_ISSUES: NO
DRESSING/BATHING_DIFFICULTY: NO
ADLS_ACUITY_SCORE: 29
ADLS_ACUITY_SCORE: 29
ADLS_ACUITY_SCORE: 49
DRESSING/BATHING_DIFFICULTY: NO
ADLS_ACUITY_SCORE: 49
ADLS_ACUITY_SCORE: 49
DIFFICULTY_EATING/SWALLOWING: NO
DOING_ERRANDS_INDEPENDENTLY_DIFFICULTY: NO
ADLS_ACUITY_SCORE: 49
CHANGE_IN_FUNCTIONAL_STATUS_SINCE_ONSET_OF_CURRENT_ILLNESS/INJURY: NO
HEARING_DIFFICULTY_OR_DEAF: NO

## 2025-06-04 NOTE — PROGRESS NOTES
PRIMARY DIAGNOSIS: ACUTE PAIN  OUTPATIENT/OBSERVATION GOALS TO BE MET BEFORE DISCHARGE:  1. Pain Status: Improved-controlled with oral pain medications.    2. Return to near baseline physical activity: Yes    3. Cleared for discharge by consultants (if involved): N/A    Discharge Planner Nurse   Safe discharge environment identified: Yes  Barriers to discharge: Yes       Entered by: Daisy Keller RN 06/04/2025     Patient is awake and alert. Calm and cooperative with nursing cares.    Vitals: B/P: 118/56, T: 98, P: 60, R: 16.     RA    Pain: 6-8/10 to upper abdomen. PRN's administered: Yes, PRN PO Oxycodone given for pain.    Denies SOB and n/v.    Mobility: up Independently to bathroom. Voiding without difficulty.     Diet: Advance Diet as Tolerated: Clear Liquid Diet      Saline Lock.    Bladder scan shows 28 ml.     Alarms on. Call light within reach. Education provided on alarms and call light.    Please review provider order for any additional goals.   Nurse to notify provider when observation goals have been met and patient is ready for discharge.

## 2025-06-04 NOTE — CONSULTS
[facility] - Digestive [facility] Consultation     Tylan D Severson  [address]   [facility] [address]  24 year old male     Admission Date/Time: 6/3/2025  Primary Care Provider: No Ref-Primary, Physician     We were asked to see the patient in consultation by Dr. Mina for evaluation of abdominal pains.    ASSESSMENT:    Tylan D Severson is a 24 year old male with PMH of alcohol use disorder, alcoholic pancreatitis, cholecystitis s/p cholecystectomy in May 2025 who was admitted on 6/3/2025 for abdominal pains.    Abdominal pains  Elevated LFT's  Acute onset of epigastric pains, nausea, and vomiting with elevated LFTs.  Patient has a history of gallstones and cholecystitis with recent cholecystectomy in May 2025.  Given his clinical presentation, I am most concerned about choledocholithiasis.  Differential diagnosis includes acute infections, acute hepatitis, PUD. There is no evidence of pancreatitis on labs and CT. He has history of alcohol use but none within the past few days.       RECOMMENDATIONS:  - Plan for MRCP. If there are concerns for choledocholithiasis, patient would need to be transferred to either [facility] or [facility] for ERCP.    - Acute hepatitis labs    -Pain/nausea management per primary    ADDENDUM:  - MRCP concerning choledocholithiasis. Pt is scheduled for ERCP at [facility] at 1:40PM. They will need him transferred there with an arrival time before 11:30AM tomorrow.   - Diet: Clears but NPO at midnight.   - Defer pain management to primary  - Start IV ZOSYN for now.      Case discussed with Dr. Madrid.    45 minutes of total time was spent providing patient care, including patient evaluation, reviewing documentation/test results, and     Dieter Arellano PA-C  [facility] - Digestive Health  [phone]  ________________________________________________________________________        CC: Abdominal pains     HPI:  Tylan D Severson is a 24 year old male with PMH of alcohol use  disorder, alcoholic pancreatitis, cholecystitis s/p cholecystectomy in May 2025 who was admitted on 6/3/2025 for abdominal pains.    Per chart review:  Patient was recently admitted from 5/8 to 5/9/2025 with concerns for gallstones and cholecystitis on CT s/p cholecystectomy on 5/8.    He was also admitted for alcoholic pancreatitis from 2/18 to 2/20/2025.  Pancreatitis was managed with fluids, bowel rest, and pain medication.     Pt explains sudden onset of epigastric/upper abdominal pains that began on yesterday (6/3) at 5 AM.  Pains initially began in the back but is now more persistent in the epigastric region.  He had associated nausea and vomiting with multiple bouts of vomiting.  He felt feverish although temperature was not checked.  He states he has been doing very well ever since his cholecystectomy a few weeks ago without any persistent pains or nausea.  Bowel habits have been normal, last BM was on Monday.     In the ED:   - Labs: Normal CBC.  CMP notable for elevated LFTs (alk phos 214, , , T bilirubin 6.0).  Lipase normal at 42.  UA normal.  EtOH level normal.  - Imaging: CT abdomen and pelvis negative for acute changes although mentions extrahepatic bile duct dilatation, more dilated compared to 5/8/2025.  No visible filling defects noted.    ROS: A comprehensive ten point review of systems was negative aside from those in mentioned in the HPI.      PAST MEDICAL HISTORY:  Patient Active Problem List    Diagnosis Date Noted    Epigastric pain 06/03/2025     Priority: Medium    Acute cholecystitis 05/08/2025     Priority: Medium    Acute pancreatitis, unspecified complication status, unspecified pancreatitis type 02/18/2025     Priority: Medium     SOCIAL HISTORY:  Social History     Tobacco Use    Smoking status: Never    Smokeless tobacco: Never   Substance Use Topics    Alcohol use: Not Currently    Drug use: Not Currently     FAMILY HISTORY:  No family history on file.  ALLERGIES: No  "Known Allergies  MEDICATIONS:   Current Facility-Administered Medications   Medication Dose Route Frequency Provider Last Rate Last Admin    acetaminophen (TYLENOL) tablet 650 mg  650 mg Oral Q4H PRN Jung Mina MD        Or    acetaminophen (TYLENOL) Suppository 650 mg  650 mg Rectal Q4H PRN Jung Mina MD        alum & mag hydroxide-simethicone (MAALOX) suspension 15 mL  15 mL Oral TID PRN Jung Mina MD        melatonin tablet 5 mg  5 mg Oral At Bedtime PRN Jung Mina MD        naloxone (NARCAN) injection 0.2 mg  0.2 mg Intravenous Q2 Min PRN Jung Mina MD        Or    naloxone (NARCAN) injection 0.4 mg  0.4 mg Intravenous Q2 Min PRN Jung Mina MD        Or    naloxone (NARCAN) injection 0.2 mg  0.2 mg Intramuscular Q2 Min PRN Jung Mina MD        Or    naloxone (NARCAN) injection 0.4 mg  0.4 mg Intramuscular Q2 Min PRN Jung Mina MD        ondansetron (ZOFRAN ODT) ODT tab 4 mg  4 mg Oral Q6H PRN Jung Mina MD   4 mg at 06/03/25 1702    Or    ondansetron (ZOFRAN) injection 4 mg  4 mg Intravenous Q6H PRN Jung Mina MD        oxyCODONE IR (ROXICODONE) half-tab 2.5 mg  2.5 mg Oral Q4H PRN Jung Mina MD   2.5 mg at 06/04/25 0934    pantoprazole (PROTONIX) EC tablet 40 mg  40 mg Oral QAM AC Jung Mina MD   40 mg at 06/04/25 0638    prochlorperazine (COMPAZINE) injection 10 mg  10 mg Intravenous Q6H PRN Jung Mina MD        Or    prochlorperazine (COMPAZINE) tablet 10 mg  10 mg Oral Q6H PRN Jung Mina MD        senna-docusate (SENOKOT-S/PERICOLACE) 8.6-50 MG per tablet 1 tablet  1 tablet Oral BID PRN Jung Mina MD        Or    senna-docusate (SENOKOT-S/PERICOLACE) 8.6-50 MG per tablet 2 tablet  2 tablet Oral BID PRN Jung Mina MD           PHYSICAL EXAM:   /68 (BP Location: Right arm, Patient Position: Supine, Cuff Size: Adult Regular)   Pulse 55   Temp 98.1  F (36.7  C) (Oral)   Resp 16   Ht 1.854 m (6' 1\")   Wt 88.5 kg (195 lb)   " SpO2 99%   BMI 25.73 kg/m     GEN: Alert, oriented x3, communicative and in NAD.  NEMO: AT, anicteric, OP without erythema, exudate, or ulcers.    NECK: Supple.    LYMPH: No LAD noted.  HRT: RRR  LUNGS: CTA  ABD:  ND, +BS, no guarding, + pain to palpation in upper abdomen, no rebound, no HSM.  SKIN: No rash, jaundice or spider angiomata  MSKL: LE free of edema, strength 5/5 all 4 extrems  NEURO: CN grossly intact     ADDITIONAL DATA:   I reviewed the patient's new clinical lab test results.   Recent Labs   Lab Test 06/03/25  0753 06/03/25  0605 05/09/25  0631 05/08/25  0611   WBC  --  9.1 14.5* 13.2*   HGB  --  14.2 13.1* 15.6   MCV  --  83 83 81   PLT  --  275 258 315   INR 0.99  --   --   --      Recent Labs   Lab Test 06/04/25  0812 06/03/25  2349 06/03/25  0605 05/09/25  0631   POTASSIUM 3.9 3.9 3.0* 4.3   CHLORIDE 101  --  102 102   CO2 26  --  22 24   BUN 5.1*  --  8.5 6.9   CR 0.87  --  0.82 0.81   ANIONGAP 11  --  17* 14     Recent Labs   Lab Test 06/04/25  0812 06/03/25  0703 06/03/25  0605 05/09/25  0631 05/08/25  0611 04/18/25  1331   ALBUMIN 4.2  --  4.5 4.1 4.8  --    BILITOTAL 6.0*  --  1.4* 0.9 0.3  --    *  --  109* 64 68  --    *  --  116* 65* 47*  --    PROTEIN  --  10*  --   --   --  30*   LIPASE 42  --  23  --  38  --         IMAGING:  I reviewed the patient's new imaging results.           Narrative & Impression   EXAM: CT ABDOMEN PELVIS W CONTRAST  LOCATION: [facility]  DATE: 6/3/2025     INDICATION: Epigastric pain, back pain, vomiting  COMPARISON: CT of the abdomen and pelvis 5/8/2025  TECHNIQUE: CT scan of the abdomen and pelvis was performed following injection of IV contrast. Multiplanar reformats were obtained. Dose reduction techniques were used.  CONTRAST: Isovue 370 90ml     FINDINGS:   LOWER CHEST: Normal.     HEPATOBILIARY:  Interval cholecystectomy. The common bile duct and cystic duct remnant are more dilated compared with 5/8/2025, but no  visible filling defects or wall thickening. No intrahepatic ductal dilation.. Liver is homogeneous attenuation and has   a smooth capsule.     PANCREAS: Normal.     SPLEEN: Normal.     ADRENAL GLANDS: Normal.     KIDNEYS/BLADDER: Normal.     BOWEL: Few sigmoid colonic diverticula are present. No dilated bowel or bowel wall thickening. No inflammatory stranding in the mesentery. No peritoneal thickening or ascites. Normal appendix.     LYMPH NODES: Normal.     VASCULATURE: Normal.     PELVIC ORGANS: Prostate gland is normal in size. Seminal vesicles are symmetric.     MUSCULOSKELETAL: Normal.                                                                      IMPRESSION:   1.  No acute inflammatory process in the abdomen or pelvis.  2.  Extrahepatic bile duct enlargement, slightly greater then prior status post cholecystectomy, favored for reservoir effect. No wall inflammation or ductal calculi identified.  3.  Minimal sigmoid diverticulosis. Normal appendix.       GI Staff Addendum  DOS 6/4/2025     Pt seen and discussed with Dieter CARO. Agree with evaluation, assessment and plan as outlined.    24 year old male with a history of acute pancreatitis thought secondary to alcohol or gallstones, cholecystitis, cholelithiasis and recent cholecystectomy 5/2025, presents with acute abdominal pain.  Patient reports being in usual state of health, when he experienced sudden onset epigastric/right upper quadrant pain.  Radiating to the back.  Denies fever, however chills.  Associated nausea and nonbloody emesis.  Presented to the hospital.  Labs performed which revealed elevated liver enzymes and bilirubin of 1.4.  Lipase 23.  CT of the abdomen revealed extrahepatic biliary dilation.  Pancreas normal.  No other acute findings.  Repeat labs this morning revealed bilirubin of 6, , , alk phos 214.  Patient reports drinking 3 vodka drinks 2 days prior to symptom onset.    /63 (BP Location: Right arm)   " Pulse 62   Temp 98.1  F (36.7  C) (Oral)   Resp 16   Ht 1.854 m (6' 1\")   Wt 88.5 kg (195 lb)   SpO2 98%   BMI 25.73 kg/m    General: A&O, NAD, non-toxic appearing  Eyes: + icterus  Gastrointestinal: Soft, TTP in RUQ, no r/g, nondistended    MRCP  1.  Cholecystectomy.  2.  Choledocholithiasis with a 4 mm stone involving the distal aspect of the common bile duct resulting in intra and extrahepatic biliary dilatation.    Assessment/Plan:  Choledocholithiasis.  Acute onset right upper quadrant pain.  Elevated liver enzymes.  Normal lipase.  MRCP with evidence of choledocholithiasis.  Normal white blood cell count, afebrile.  No evidence cholangitis.    - N.p.o.  - IV antibiotics.  Zosyn started.  - Hold anticoagulation antiplatelets.  - Transfer to [facility] for ERCP.    20 minutes of total time was spent providing patient care including patient evaluation, reviewing documentation/test results, and .                                                  Kristofer Madrid MD  Thank you for the opportunity to participate in the care of this patient.   Please feel free to call me with any questions or concerns.  Phone number [phone].              "

## 2025-06-04 NOTE — PLAN OF CARE
PRIMARY DIAGNOSIS: ACUTE PAIN  OUTPATIENT/OBSERVATION GOALS TO BE MET BEFORE DISCHARGE:  1. Pain Status: Improved-controlled with oral pain medications.    2. Return to near baseline physical activity: Yes    3. Cleared for discharge by consultants (if involved): N/A    Discharge Planner Nurse   Safe discharge environment identified: Yes  Barriers to discharge: Yes Nausea and Abdominal Pain still present.        Entered by: Bev Garcia RN 06/03/2025 7:24 PM     Please review provider order for any additional goals.   Nurse to notify provider when observation goals have been met and patient is ready for discharge.

## 2025-06-04 NOTE — PROGRESS NOTES
PRIMARY DIAGNOSIS: ACUTE PAIN  OUTPATIENT/OBSERVATION GOALS TO BE MET BEFORE DISCHARGE:  1. Pain Status: Improved-controlled with oral pain medications.    2. Return to near baseline physical activity: Yes    3. Cleared for discharge by consultants (if involved): N/A    Discharge Planner Nurse   Safe discharge environment identified: Yes  Barriers to discharge: Yes       Entered by: Daisy Keller RN 06/04/2025     VSS on RA. Reports pain 8/10 to upper abdomen. PRN Oxy given for pain.     Please review provider order for any additional goals.   Nurse to notify provider when observation goals have been met and patient is ready for discharge.

## 2025-06-04 NOTE — PROGRESS NOTES
MRCP showed choledocholithiasis with a 4 mm stone involving the distal aspect of the common bile duct resulting in intra and extrahepatic biliary dilatation.     Plan  Transfer to [facility] for planned ERCP on 6/5 around 1 PM.

## 2025-06-04 NOTE — PROGRESS NOTES
PRIMARY DIAGNOSIS: ACUTE PAIN  OUTPATIENT/OBSERVATION GOALS TO BE MET BEFORE DISCHARGE:  1. Pain Status: Improved-controlled with oral pain medications.    2. Return to near baseline physical activity: Yes    3. Cleared for discharge by consultants (if involved): No    Discharge Planner Nurse   Safe discharge environment identified: Yes  Barriers to discharge: Yes       Entered by: Cesilia Azul RN 06/04/2025 11:53 AM     Pt had critical lab values this morning. New consult for GI. Currently down at MRI for MRCP. Pain is pretty well controled with PO Oxycodone.     Please review provider order for any additional goals.   Nurse to notify provider when observation goals have been met and patient is ready for discharge.

## 2025-06-04 NOTE — PROGRESS NOTES
[facility]    Medicine Progress Note - Hospitalist Service    Date of Admission:  6/3/2025    Assessment & Plan   Tylan D Severson is a 24 year old male admitted on 6/3/2025.  He has a past medical history significant for alcohol use disorder, alcoholic pancreatitis, cholecystitis status post cholecystectomy in May 2025 who presented to the hospital with epigastric pain. Initial labs showed mild transaminitis.  6/4, worsening transaminitis and alkaline phosphate concerning for choledocholithiasis.  Pending MRCP.  Gastroenterology consulted.    Abdominal pain  Transaminitis  Sudden abdominal pain started on 6/3/2025 associated with nausea and vomiting.  CT abdomen pelvis extrahepatic bile duct enlargement, slightly greater then prior status post cholecystectomy, favored for reservoir effect. No wall inflammation or ductal calculi identified.  No signs of pancreatitis.  Worsening transaminitis over the last 24 hours.  Etiology remains unclear, possible choledocholithiasis, other possible etiologies include peptic ulcer disease. Less likely acute hepatitis.    Plan  N.p.o. until further imaging.  MRCP  Consult gastroenterology  Acute hepatitis panel  Continue pantoprazole 40 mg daily  Continue GI cocktail as needed.  Hold Tylenol  Continue as needed oxycodone    Alcohol use disorder  Last alcoholic drink on 6/1/2025..  No signs of alcohol withdrawal.    Plan  Educated about the importance of complete alcohol cessation    Hypokalemia (repleted)    History of marijuana use.           Observation Goals: -diagnostic tests and consults completed and resulted, -vital signs normal or at patient baseline, -tolerating oral intake to maintain hydration, -adequate pain control on oral analgesics, Nurse to notify provider when observation goals have been met and patient is ready for discharge.  Diet: NPO for Procedure/Surgery per Anesthesia Guidelines Except for: Meds; Clear liquids before  "procedure/surgery: ADULT (Age GREATER than or Equal to 18 years) - Clear liquids 2 hours before procedure/surgery    DVT Prophylaxis: Low Risk/Ambulatory with no VTE prophylaxis indicated  Hernandez Catheter: Not present  Lines: None     Cardiac Monitoring: None  Code Status: Full Code      Clinically Significant Risk Factors Present on Admission        # Hypokalemia: Lowest K = 3 mmol/L in last 2 days, will replace as needed                      # Overweight: Estimated body mass index is 25.73 kg/m  as calculated from the following:    Height as of this encounter: 1.854 m (6' 1\").    Weight as of this encounter: 88.5 kg (195 lb).              Social Drivers of Health    Food Insecurity: Low Risk  (6/3/2025)    Food Insecurity     Within the past 12 months, did you worry that your food would run out before you got money to buy more?: No     Within the past 12 months, did the food you bought just not last and you didn t have money to get more?: No   Recent Concern: Food Insecurity - High Risk (5/8/2025)    Food Insecurity     Within the past 12 months, did you worry that your food would run out before you got money to buy more?: Yes     Within the past 12 months, did the food you bought just not last and you didn t have money to get more?: Yes   Transportation Needs: Low Risk  (6/3/2025)    Transportation Needs     Within the past 12 months, has lack of transportation kept you from medical appointments, getting your medicines, non-medical meetings or appointments, work, or from getting things that you need?: No   Recent Concern: Transportation Needs - High Risk (5/8/2025)    Transportation Needs     Within the past 12 months, has lack of transportation kept you from medical appointments, getting your medicines, non-medical meetings or appointments, work, or from getting things that you need?: Yes    Received from [facility]    Social Connections          Disposition Plan     Medically " Ready for Discharge: Anticipated in 2-4 Days             ANDRES VANCE MD  Hospitalist Service  [facility]  Securely message with [employer] (more info)  Text page via [employer] Paging/Directory   ______________________________________________________________________    Interval History   Continues to experience epigastric pain, however, his nausea has improved.  No further episode of vomiting.  Denies any fever or chills.  Denies any chest pain or shortness of breath.    Physical Exam   Vital Signs: Temp: 98.5  F (36.9  C) Temp src: Oral BP: 131/78 Pulse: 57   Resp: 18 SpO2: 100 % O2 Device: None (Room air)    Weight: 195 lbs 0 oz    Physical Exam  Constitutional:       General: He is not in acute distress.     Appearance: He is not ill-appearing or toxic-appearing.   Abdominal:      Palpations: Abdomen is soft.      Tenderness: There is abdominal tenderness.      Comments: Epigastric tenderness the patient.   Skin:     General: Skin is warm.   Neurological:      Mental Status: He is alert.   Psychiatric:         Mood and Affect: Mood normal.         Thought Content: Thought content normal.          Medical Decision Making       47 MINUTES SPENT BY ME on the date of service doing chart review, history, exam, documentation & further activities per the note.      Data     I have personally reviewed the following data over the past 24 hrs:    N/A  \   N/A   / N/A     138 101 5.1 (L) /  102 (H)   3.9 26 0.87 \     ALT: 471 (H) AST: 691 (HH) AP: 214 (H) TBILI: 6.0 (H)   ALB: 4.2 TOT PROTEIN: 7.0 LIPASE: 42       Imaging results reviewed over the past 24 hrs:   Recent Results (from the past 24 hours)   MR Abdomen MRCP without Contrast    Narrative    EXAM: MR ABDOMEN MRCP W/O CONTRAST  LOCATION: [facility]  DATE: 6/4/2025    INDICATION: History of gallstones. Elevated LFTs.  COMPARISON: CT abdomen pelvis, 6/3/2025.  TECHNIQUE: Routine MR liver/pancreas protocol including axial  and coronal MRCP sequences. 2D and 3D reconstruction performed by MR technologist including MIP reconstruction and slab cholangiograms. If performed with contrast, additional dynamic T1 post   IV contrast images.  CONTRAST:    FINDINGS: Cholecystectomy. There is a 4 mm stone involving the distal aspect of the common bile duct, consistent with choledocholithiasis. The common bile duct more proximally measures up to 8 mm in diameter, and there is also intrahepatic biliary   dilatation.    The pancreas, spleen, adrenal glands, and kidneys are grossly normal. The visualized bowel is unremarkable. The appendix is normal. No bowel obstruction.      Impression    IMPRESSION:  1.  Cholecystectomy.  2.  Choledocholithiasis with a 4 mm stone involving the distal aspect of the common bile duct resulting in intra and extrahepatic biliary dilatation.

## 2025-06-04 NOTE — DISCHARGE SUMMARY
"[facility]  Hospitalist Discharge Summary      Date of Admission:  6/3/2025  Date of Discharge:  6/4/2025  Discharging Provider: ANDRES VANCE MD  Discharge Service: Hospitalist Service    Discharge Diagnoses   Acute choledocholithiasis    Clinically Significant Risk Factors     # Overweight: Estimated body mass index is 25.73 kg/m  as calculated from the following:    Height as of this encounter: 1.854 m (6' 1\").    Weight as of this encounter: 88.5 kg (195 lb).           Unresulted Labs Ordered in the Past 30 Days of this Admission       No orders found for last 31 day(s).            Discharge Disposition   Transferred to [facility] for ERCP  Condition at discharge: Good    Hospital Course   Tylan D Severson is a 24 year old male admitted on 6/3/2025.  He has a past medical history significant for alcohol use disorder, alcoholic pancreatitis, cholecystitis status post cholecystectomy in May 2025 who presented to the hospital with epigastric pain. Initial labs showed mild transaminitis.  6/4, worsening transaminitis and alkaline phosphate.  MRCP showed choledocholithiasis.  Patient was transferred to [facility] on 6/4 for planned ERCP on 6/5.  Sign out given to admitting hospitalist at [facility].    Acute choledocholithiasis.  Abdominal pain  Transaminitis  Sudden abdominal pain started on 6/3/2025 associated with nausea and vomiting.  CT abdomen pelvis extrahepatic bile duct enlargement, slightly greater then prior status post cholecystectomy, favored for reservoir effect. No wall inflammation or ductal calculi identified.  No signs of pancreatitis.  MRCP showed choledocholithiasis with a 4 mm stone involving the distal aspect of the common bile duct resulting in intra and extrahepatic biliary dilatation.     Plan  N.p.o. after midnight for planned ERCP  Continue normal saline at 75 cc/h  Continue empiric Zosyn started by gastroenterology  Continue current pain " management with as needed oxycodone and hydromorphone  Continue to hold Tylenol given transaminitis  Stop pantoprazole and GI cocktail.    Alcohol use disorder  Last alcoholic drink on 6/1/2025..  No signs of alcohol withdrawal.    Plan  Educated about the importance of complete alcohol cessation    Hypokalemia (repleted)    History of marijuana use.      Consultations This Hospital Stay   GASTROENTEROLOGY IP CONSULT    Code Status   Full Code    Time Spent on this Encounter   I, ANDRES VANCE MD, personally saw the patient today and spent greater than 30 minutes discharging this patient.       ANDRES VANCE MD  [address] [address]  Phone: [phone]  Fax: [phone]  ______________________________________________________________________    Physical Exam   Vital Signs: Temp: 98.1  F (36.7  C) Temp src: Oral BP: 134/63 Pulse: 62   Resp: 16 SpO2: 98 % O2 Device: None (Room air)    Weight: 195 lbs 0 oz  Physical Exam  Constitutional:       General: He is not in acute distress.     Appearance: He is not ill-appearing.   Cardiovascular:      Rate and Rhythm: Normal rate.   Abdominal:      Palpations: Abdomen is soft.      Tenderness: There is abdominal tenderness.      Comments: Epigastric tenderness to palpation   Neurological:      Mental Status: He is alert.   Psychiatric:         Mood and Affect: Mood normal.         Thought Content: Thought content normal.         Judgment: Judgment normal.             Primary Care Physician   Physician No Ref-Primary    Discharge Orders   No discharge procedures on file.    Significant Results and Procedures   Most Recent 3 CBC's:  Recent Labs   Lab Test 06/03/25  0605 05/09/25  0631 05/08/25  0611   WBC 9.1 14.5* 13.2*   HGB 14.2 13.1* 15.6   MCV 83 83 81    258 315     Most Recent 3 BMP's:  Recent Labs   Lab Test 06/04/25  0812 06/03/25  2349 06/03/25  0605 05/09/25  0631     --  141  140   POTASSIUM 3.9 3.9 3.0* 4.3   CHLORIDE 101  --  102 102   CO2 26  --  22 24   BUN 5.1*  --  8.5 6.9   CR 0.87  --  0.82 0.81   ANIONGAP 11  --  17* 14   SONYA 9.7  --  9.8 9.0   *  --  147* 114*   ,   Results for orders placed or performed during the hospital encounter of 06/03/25   CT Abdomen Pelvis w Contrast    Narrative    EXAM: CT ABDOMEN PELVIS W CONTRAST  LOCATION: [facility]  DATE: 6/3/2025    INDICATION: Epigastric pain, back pain, vomiting  COMPARISON: CT of the abdomen and pelvis 5/8/2025  TECHNIQUE: CT scan of the abdomen and pelvis was performed following injection of IV contrast. Multiplanar reformats were obtained. Dose reduction techniques were used.  CONTRAST: Isovue 370 90ml    FINDINGS:   LOWER CHEST: Normal.    HEPATOBILIARY:  Interval cholecystectomy. The common bile duct and cystic duct remnant are more dilated compared with 5/8/2025, but no visible filling defects or wall thickening. No intrahepatic ductal dilation.. Liver is homogeneous attenuation and has   a smooth capsule.    PANCREAS: Normal.    SPLEEN: Normal.    ADRENAL GLANDS: Normal.    KIDNEYS/BLADDER: Normal.    BOWEL: Few sigmoid colonic diverticula are present. No dilated bowel or bowel wall thickening. No inflammatory stranding in the mesentery. No peritoneal thickening or ascites. Normal appendix.    LYMPH NODES: Normal.    VASCULATURE: Normal.    PELVIC ORGANS: Prostate gland is normal in size. Seminal vesicles are symmetric.    MUSCULOSKELETAL: Normal.      Impression    IMPRESSION:   1.  No acute inflammatory process in the abdomen or pelvis.  2.  Extrahepatic bile duct enlargement, slightly greater then prior status post cholecystectomy, favored for reservoir effect. No wall inflammation or ductal calculi identified.  3.  Minimal sigmoid diverticulosis. Normal appendix.   MR Abdomen MRCP without Contrast    Narrative    EXAM: MR ABDOMEN MRCP W/O CONTRAST  LOCATION: [facility]  DATE: 6/4/2025    INDICATION: History of gallstones. Elevated LFTs.  COMPARISON: CT abdomen pelvis, 6/3/2025.  TECHNIQUE: Routine MR liver/pancreas protocol including axial and coronal MRCP sequences. 2D and 3D reconstruction performed by MR technologist including MIP reconstruction and slab cholangiograms. If performed with contrast, additional dynamic T1 post   IV contrast images.  CONTRAST: None     FINDINGS: Cholecystectomy. There is a 4 mm stone involving the distal aspect of the common bile duct, consistent with choledocholithiasis. The common bile duct more proximally measures up to 8 mm in diameter, and there is also intrahepatic biliary   dilatation.    The pancreas, spleen, adrenal glands, and kidneys are grossly normal. The visualized bowel is unremarkable. The appendix is normal. No bowel obstruction.      Impression    IMPRESSION:  1.  Cholecystectomy.  2.  Choledocholithiasis with a 4 mm stone involving the distal aspect of the common bile duct resulting in intra and extrahepatic biliary dilatation.       Discharge Medications   Current Discharge Medication List        CONTINUE these medications which have NOT CHANGED    Details   FIBER ADULT GUMMIES PO Take 1 tablet by mouth every morning. Take 1 gummy po qam after meals           Allergies   No Known Allergies

## 2025-06-04 NOTE — LETTER
[facility] EXTENDED RECOVERY AND SHORT STAY  [address] [place] [id number]  Phone: [phone]  Fax: [phone]    June 7, 2025        To whom it may concern:    RE: Tylan D Severson    Patient was hospitalized and treated at our facility 6/3/2025 - 6/7/2025. Please contact me for questions or concerns.      Sincerely,            Marylu Hardin PA-C

## 2025-06-05 ENCOUNTER — ANESTHESIA EVENT (OUTPATIENT)
Dept: SURGERY | Facility: HOSPITAL | Age: 25
End: 2025-06-05

## 2025-06-05 ENCOUNTER — APPOINTMENT (OUTPATIENT)
Dept: RADIOLOGY | Facility: HOSPITAL | Age: 25
End: 2025-06-05
Attending: INTERNAL MEDICINE

## 2025-06-05 ENCOUNTER — ANESTHESIA (OUTPATIENT)
Dept: SURGERY | Facility: HOSPITAL | Age: 25
End: 2025-06-05

## 2025-06-05 VITALS
TEMPERATURE: 98 F | RESPIRATION RATE: 20 BRPM | HEART RATE: 86 BPM | SYSTOLIC BLOOD PRESSURE: 125 MMHG | OXYGEN SATURATION: 100 % | DIASTOLIC BLOOD PRESSURE: 67 MMHG

## 2025-06-05 LAB
ABO + RH BLD: NORMAL
ALBUMIN SERPL BCG-MCNC: 4.2 G/DL (ref 3.5–5.2)
ALP SERPL-CCNC: 247 U/L (ref 40–150)
ALT SERPL W P-5'-P-CCNC: 438 U/L (ref 0–70)
ANION GAP SERPL CALCULATED.3IONS-SCNC: 10 MMOL/L (ref 7–15)
AST SERPL W P-5'-P-CCNC: 372 U/L (ref 0–45)
BILIRUB SERPL-MCNC: 5.6 MG/DL
BLD GP AB SCN SERPL QL: NEGATIVE
BUN SERPL-MCNC: 2.8 MG/DL (ref 6–20)
CALCIUM SERPL-MCNC: 9.7 MG/DL (ref 8.8–10.4)
CHLORIDE SERPL-SCNC: 103 MMOL/L (ref 98–107)
CREAT SERPL-MCNC: 0.89 MG/DL (ref 0.67–1.17)
EGFRCR SERPLBLD CKD-EPI 2021: >90 ML/MIN/1.73M2
ERCP: NORMAL
ERYTHROCYTE [DISTWIDTH] IN BLOOD BY AUTOMATED COUNT: 12.4 % (ref 10–15)
GLUCOSE BLDC GLUCOMTR-MCNC: 92 MG/DL (ref 70–99)
GLUCOSE SERPL-MCNC: 107 MG/DL (ref 70–99)
HCO3 SERPL-SCNC: 27 MMOL/L (ref 22–29)
HCT VFR BLD AUTO: 38.9 % (ref 40–53)
HGB BLD-MCNC: 13.3 G/DL (ref 13.3–17.7)
INR PPP: 0.96 (ref 0.85–1.15)
MCH RBC QN AUTO: 28.2 PG (ref 26.5–33)
MCHC RBC AUTO-ENTMCNC: 34.2 G/DL (ref 31.5–36.5)
MCV RBC AUTO: 83 FL (ref 78–100)
PLATELET # BLD AUTO: 237 10E3/UL (ref 150–450)
POTASSIUM SERPL-SCNC: 4 MMOL/L (ref 3.4–5.3)
PROT SERPL-MCNC: 7.3 G/DL (ref 6.4–8.3)
PROTHROMBIN TIME: 13.1 SECONDS (ref 11.8–14.8)
RBC # BLD AUTO: 4.71 10E6/UL (ref 4.4–5.9)
SODIUM SERPL-SCNC: 140 MMOL/L (ref 135–145)
SPECIMEN EXP DATE BLD: NORMAL
WBC # BLD AUTO: 6.5 10E3/UL (ref 4–11)

## 2025-06-05 PROCEDURE — 250N000011 HC RX IP 250 OP 636: Performed by: STUDENT IN AN ORGANIZED HEALTH CARE EDUCATION/TRAINING PROGRAM

## 2025-06-05 PROCEDURE — 85014 HEMATOCRIT: CPT | Performed by: INTERNAL MEDICINE

## 2025-06-05 PROCEDURE — 250N000011 HC RX IP 250 OP 636: Performed by: INTERNAL MEDICINE

## 2025-06-05 PROCEDURE — 250N000011 HC RX IP 250 OP 636: Mod: JZ | Performed by: INTERNAL MEDICINE

## 2025-06-05 PROCEDURE — 86900 BLOOD TYPING SEROLOGIC ABO: CPT | Performed by: INTERNAL MEDICINE

## 2025-06-05 PROCEDURE — 99232 SBSQ HOSP IP/OBS MODERATE 35: CPT | Mod: FS | Performed by: EMERGENCY MEDICINE

## 2025-06-05 PROCEDURE — 120N000001 HC R&B MED SURG/OB

## 2025-06-05 PROCEDURE — 0FC98ZZ EXTIRPATION OF MATTER FROM COMMON BILE DUCT, VIA NATURAL OR ARTIFICIAL OPENING ENDOSCOPIC: ICD-10-PCS | Performed by: INTERNAL MEDICINE

## 2025-06-05 PROCEDURE — 250N000013 HC RX MED GY IP 250 OP 250 PS 637: Performed by: INTERNAL MEDICINE

## 2025-06-05 PROCEDURE — 255N000002 HC RX 255 OP 636: Performed by: INTERNAL MEDICINE

## 2025-06-05 PROCEDURE — 360N000082 HC SURGERY LEVEL 2 W/ FLUORO, PER MIN: Performed by: INTERNAL MEDICINE

## 2025-06-05 PROCEDURE — 85610 PROTHROMBIN TIME: CPT | Performed by: INTERNAL MEDICINE

## 2025-06-05 PROCEDURE — 80048 BASIC METABOLIC PNL TOTAL CA: CPT | Performed by: INTERNAL MEDICINE

## 2025-06-05 PROCEDURE — 99207 PR APP CREDIT; MD BILLING SHARED VISIT: CPT | Mod: FS

## 2025-06-05 PROCEDURE — 370N000017 HC ANESTHESIA TECHNICAL FEE, PER MIN: Performed by: INTERNAL MEDICINE

## 2025-06-05 PROCEDURE — 85041 AUTOMATED RBC COUNT: CPT | Performed by: INTERNAL MEDICINE

## 2025-06-05 PROCEDURE — 272N000001 HC OR GENERAL SUPPLY STERILE: Performed by: INTERNAL MEDICINE

## 2025-06-05 PROCEDURE — 250N000011 HC RX IP 250 OP 636: Mod: JZ

## 2025-06-05 PROCEDURE — 250N000025 HC SEVOFLURANE, PER MIN: Performed by: INTERNAL MEDICINE

## 2025-06-05 PROCEDURE — 250N000009 HC RX 250: Performed by: NURSE ANESTHETIST, CERTIFIED REGISTERED

## 2025-06-05 PROCEDURE — 710N000009 HC RECOVERY PHASE 1, LEVEL 1, PER MIN: Performed by: INTERNAL MEDICINE

## 2025-06-05 PROCEDURE — 258N000003 HC RX IP 258 OP 636: Performed by: INTERNAL MEDICINE

## 2025-06-05 PROCEDURE — C1769 GUIDE WIRE: HCPCS | Performed by: INTERNAL MEDICINE

## 2025-06-05 PROCEDURE — 82374 ASSAY BLOOD CARBON DIOXIDE: CPT | Performed by: INTERNAL MEDICINE

## 2025-06-05 PROCEDURE — 258N000003 HC RX IP 258 OP 636: Performed by: STUDENT IN AN ORGANIZED HEALTH CARE EDUCATION/TRAINING PROGRAM

## 2025-06-05 PROCEDURE — 258N000003 HC RX IP 258 OP 636: Performed by: ANESTHESIOLOGY

## 2025-06-05 PROCEDURE — 999N000180 XR SURGERY CARM FLUORO LESS THAN 5 MIN

## 2025-06-05 PROCEDURE — 86901 BLOOD TYPING SEROLOGIC RH(D): CPT | Performed by: INTERNAL MEDICINE

## 2025-06-05 PROCEDURE — 999N000141 HC STATISTIC PRE-PROCEDURE NURSING ASSESSMENT: Performed by: INTERNAL MEDICINE

## 2025-06-05 PROCEDURE — C1726 CATH, BAL DIL, NON-VASCULAR: HCPCS | Performed by: INTERNAL MEDICINE

## 2025-06-05 PROCEDURE — 250N000011 HC RX IP 250 OP 636: Performed by: NURSE ANESTHETIST, CERTIFIED REGISTERED

## 2025-06-05 PROCEDURE — 36415 COLL VENOUS BLD VENIPUNCTURE: CPT | Performed by: INTERNAL MEDICINE

## 2025-06-05 RX ORDER — FENTANYL CITRATE 50 UG/ML
25 INJECTION, SOLUTION INTRAMUSCULAR; INTRAVENOUS EVERY 5 MIN PRN
Status: DISCONTINUED | OUTPATIENT
Start: 2025-06-05 | End: 2025-06-05 | Stop reason: HOSPADM

## 2025-06-05 RX ORDER — HYDROMORPHONE HCL IN WATER/PF 6 MG/30 ML
0.4 PATIENT CONTROLLED ANALGESIA SYRINGE INTRAVENOUS
Status: DISCONTINUED | OUTPATIENT
Start: 2025-06-05 | End: 2025-06-05

## 2025-06-05 RX ORDER — OXYCODONE HYDROCHLORIDE 5 MG/1
10 TABLET ORAL
Status: DISCONTINUED | OUTPATIENT
Start: 2025-06-05 | End: 2025-06-05 | Stop reason: HOSPADM

## 2025-06-05 RX ORDER — NALOXONE HYDROCHLORIDE 0.4 MG/ML
0.4 INJECTION, SOLUTION INTRAMUSCULAR; INTRAVENOUS; SUBCUTANEOUS
Status: DISCONTINUED | OUTPATIENT
Start: 2025-06-05 | End: 2025-06-05

## 2025-06-05 RX ORDER — NALOXONE HYDROCHLORIDE 0.4 MG/ML
0.2 INJECTION, SOLUTION INTRAMUSCULAR; INTRAVENOUS; SUBCUTANEOUS
Status: DISCONTINUED | OUTPATIENT
Start: 2025-06-05 | End: 2025-06-05

## 2025-06-05 RX ORDER — PROCHLORPERAZINE MALEATE 10 MG
10 TABLET ORAL EVERY 6 HOURS PRN
Status: DISCONTINUED | OUTPATIENT
Start: 2025-06-05 | End: 2025-06-05

## 2025-06-05 RX ORDER — ONDANSETRON 2 MG/ML
4 INJECTION INTRAMUSCULAR; INTRAVENOUS EVERY 6 HOURS PRN
Status: DISCONTINUED | OUTPATIENT
Start: 2025-06-05 | End: 2025-06-05

## 2025-06-05 RX ORDER — ACETAMINOPHEN 650 MG/1
650 SUPPOSITORY RECTAL EVERY 4 HOURS PRN
Status: ACTIVE | OUTPATIENT
Start: 2025-06-05

## 2025-06-05 RX ORDER — ACETAMINOPHEN 325 MG/1
650 TABLET ORAL EVERY 4 HOURS PRN
Status: ACTIVE | OUTPATIENT
Start: 2025-06-05

## 2025-06-05 RX ORDER — FLUMAZENIL 0.1 MG/ML
0.2 INJECTION, SOLUTION INTRAVENOUS
Status: ACTIVE | OUTPATIENT
Start: 2025-06-05 | End: 2025-06-06

## 2025-06-05 RX ORDER — ONDANSETRON 2 MG/ML
4 INJECTION INTRAMUSCULAR; INTRAVENOUS EVERY 30 MIN PRN
Status: DISCONTINUED | OUTPATIENT
Start: 2025-06-05 | End: 2025-06-05 | Stop reason: HOSPADM

## 2025-06-05 RX ORDER — NALOXONE HYDROCHLORIDE 0.4 MG/ML
0.1 INJECTION, SOLUTION INTRAMUSCULAR; INTRAVENOUS; SUBCUTANEOUS
Status: DISCONTINUED | OUTPATIENT
Start: 2025-06-05 | End: 2025-06-05 | Stop reason: HOSPADM

## 2025-06-05 RX ORDER — FENTANYL CITRATE 50 UG/ML
50 INJECTION, SOLUTION INTRAMUSCULAR; INTRAVENOUS EVERY 5 MIN PRN
Status: DISCONTINUED | OUTPATIENT
Start: 2025-06-05 | End: 2025-06-05 | Stop reason: HOSPADM

## 2025-06-05 RX ORDER — ONDANSETRON 4 MG/1
4 TABLET, ORALLY DISINTEGRATING ORAL EVERY 30 MIN PRN
Status: DISCONTINUED | OUTPATIENT
Start: 2025-06-05 | End: 2025-06-05 | Stop reason: HOSPADM

## 2025-06-05 RX ORDER — AMOXICILLIN 250 MG
1 CAPSULE ORAL 2 TIMES DAILY PRN
Status: DISCONTINUED | OUTPATIENT
Start: 2025-06-05 | End: 2025-06-05

## 2025-06-05 RX ORDER — DEXAMETHASONE SODIUM PHOSPHATE 4 MG/ML
4 INJECTION, SOLUTION INTRA-ARTICULAR; INTRALESIONAL; INTRAMUSCULAR; INTRAVENOUS; SOFT TISSUE
Status: DISCONTINUED | OUTPATIENT
Start: 2025-06-05 | End: 2025-06-05 | Stop reason: HOSPADM

## 2025-06-05 RX ORDER — ONDANSETRON 4 MG/1
4 TABLET, ORALLY DISINTEGRATING ORAL EVERY 6 HOURS PRN
Status: DISCONTINUED | OUTPATIENT
Start: 2025-06-05 | End: 2025-06-05

## 2025-06-05 RX ORDER — PROPOFOL 10 MG/ML
INJECTION, EMULSION INTRAVENOUS PRN
Status: DISCONTINUED | OUTPATIENT
Start: 2025-06-05 | End: 2025-06-05

## 2025-06-05 RX ORDER — FENTANYL CITRATE 50 UG/ML
INJECTION, SOLUTION INTRAMUSCULAR; INTRAVENOUS PRN
Status: DISCONTINUED | OUTPATIENT
Start: 2025-06-05 | End: 2025-06-05

## 2025-06-05 RX ORDER — LIDOCAINE 40 MG/G
CREAM TOPICAL
Status: DISCONTINUED | OUTPATIENT
Start: 2025-06-05 | End: 2025-06-05 | Stop reason: HOSPADM

## 2025-06-05 RX ORDER — SODIUM CHLORIDE 9 MG/ML
INJECTION, SOLUTION INTRAVENOUS CONTINUOUS
Status: ACTIVE | OUTPATIENT
Start: 2025-06-05

## 2025-06-05 RX ORDER — ONDANSETRON 2 MG/ML
INJECTION INTRAMUSCULAR; INTRAVENOUS PRN
Status: DISCONTINUED | OUTPATIENT
Start: 2025-06-05 | End: 2025-06-05

## 2025-06-05 RX ORDER — LIDOCAINE HYDROCHLORIDE 10 MG/ML
INJECTION, SOLUTION INFILTRATION; PERINEURAL PRN
Status: DISCONTINUED | OUTPATIENT
Start: 2025-06-05 | End: 2025-06-05

## 2025-06-05 RX ORDER — PIPERACILLIN SODIUM, TAZOBACTAM SODIUM 4; .5 G/20ML; G/20ML
4.5 INJECTION, POWDER, LYOPHILIZED, FOR SOLUTION INTRAVENOUS EVERY 6 HOURS
Status: DISPENSED | OUTPATIENT
Start: 2025-06-05

## 2025-06-05 RX ORDER — SODIUM CHLORIDE, SODIUM LACTATE, POTASSIUM CHLORIDE, CALCIUM CHLORIDE 600; 310; 30; 20 MG/100ML; MG/100ML; MG/100ML; MG/100ML
INJECTION, SOLUTION INTRAVENOUS CONTINUOUS
Status: DISCONTINUED | OUTPATIENT
Start: 2025-06-05 | End: 2025-06-05 | Stop reason: HOSPADM

## 2025-06-05 RX ORDER — HYDROMORPHONE HCL IN WATER/PF 6 MG/30 ML
0.2 PATIENT CONTROLLED ANALGESIA SYRINGE INTRAVENOUS EVERY 5 MIN PRN
Status: DISCONTINUED | OUTPATIENT
Start: 2025-06-05 | End: 2025-06-05 | Stop reason: HOSPADM

## 2025-06-05 RX ORDER — AMOXICILLIN 250 MG
2 CAPSULE ORAL 2 TIMES DAILY PRN
Status: DISCONTINUED | OUTPATIENT
Start: 2025-06-05 | End: 2025-06-05

## 2025-06-05 RX ORDER — OXYCODONE HYDROCHLORIDE 5 MG/1
5 TABLET ORAL
Status: DISCONTINUED | OUTPATIENT
Start: 2025-06-05 | End: 2025-06-05 | Stop reason: HOSPADM

## 2025-06-05 RX ORDER — HYDROMORPHONE HCL IN WATER/PF 6 MG/30 ML
0.4 PATIENT CONTROLLED ANALGESIA SYRINGE INTRAVENOUS EVERY 5 MIN PRN
Status: DISCONTINUED | OUTPATIENT
Start: 2025-06-05 | End: 2025-06-05 | Stop reason: HOSPADM

## 2025-06-05 RX ADMIN — HYDROMORPHONE HYDROCHLORIDE 0.5 MG: 1 INJECTION, SOLUTION INTRAMUSCULAR; INTRAVENOUS; SUBCUTANEOUS at 17:51

## 2025-06-05 RX ADMIN — HYDROMORPHONE HYDROCHLORIDE 0.5 MG: 1 INJECTION, SOLUTION INTRAMUSCULAR; INTRAVENOUS; SUBCUTANEOUS at 21:02

## 2025-06-05 RX ADMIN — PROCHLORPERAZINE EDISYLATE 10 MG: 5 INJECTION INTRAMUSCULAR; INTRAVENOUS at 23:25

## 2025-06-05 RX ADMIN — OXYCODONE HYDROCHLORIDE 5 MG: 5 TABLET ORAL at 03:04

## 2025-06-05 RX ADMIN — PIPERACILLIN AND TAZOBACTAM 4.5 G: 4; .5 INJECTION, POWDER, FOR SOLUTION INTRAVENOUS at 15:25

## 2025-06-05 RX ADMIN — ONDANSETRON 4 MG: 2 INJECTION INTRAMUSCULAR; INTRAVENOUS at 16:48

## 2025-06-05 RX ADMIN — HYDROMORPHONE HYDROCHLORIDE 0.5 MG: 1 INJECTION, SOLUTION INTRAMUSCULAR; INTRAVENOUS; SUBCUTANEOUS at 09:27

## 2025-06-05 RX ADMIN — PIPERACILLIN AND TAZOBACTAM 4.5 G: 4; .5 INJECTION, POWDER, FOR SOLUTION INTRAVENOUS at 21:11

## 2025-06-05 RX ADMIN — PIPERACILLIN AND TAZOBACTAM 4.5 G: 4; .5 INJECTION, POWDER, FOR SOLUTION INTRAVENOUS at 03:30

## 2025-06-05 RX ADMIN — LIDOCAINE HYDROCHLORIDE 50 MG: 10 INJECTION, SOLUTION INFILTRATION; PERINEURAL at 16:25

## 2025-06-05 RX ADMIN — SODIUM CHLORIDE: 0.9 INJECTION, SOLUTION INTRAVENOUS at 03:38

## 2025-06-05 RX ADMIN — PROPOFOL 200 MG: 10 INJECTION, EMULSION INTRAVENOUS at 16:25

## 2025-06-05 RX ADMIN — PROCHLORPERAZINE MALEATE 10 MG: 10 TABLET ORAL at 03:04

## 2025-06-05 RX ADMIN — SODIUM CHLORIDE: 0.9 INJECTION, SOLUTION INTRAVENOUS at 11:08

## 2025-06-05 RX ADMIN — SODIUM CHLORIDE, SODIUM LACTATE, POTASSIUM CHLORIDE, AND CALCIUM CHLORIDE: .6; .31; .03; .02 INJECTION, SOLUTION INTRAVENOUS at 15:59

## 2025-06-05 RX ADMIN — ROCURONIUM 50 MG: 50 INJECTION, SOLUTION INTRAVENOUS at 16:25

## 2025-06-05 RX ADMIN — HYDROMORPHONE HYDROCHLORIDE 0.5 MG: 1 INJECTION, SOLUTION INTRAMUSCULAR; INTRAVENOUS; SUBCUTANEOUS at 11:47

## 2025-06-05 RX ADMIN — FENTANYL CITRATE 100 MCG: 50 INJECTION, SOLUTION INTRAMUSCULAR; INTRAVENOUS at 16:23

## 2025-06-05 RX ADMIN — ONDANSETRON 4 MG: 2 INJECTION, SOLUTION INTRAMUSCULAR; INTRAVENOUS at 11:55

## 2025-06-05 RX ADMIN — PIPERACILLIN AND TAZOBACTAM 4.5 G: 4; .5 INJECTION, POWDER, FOR SOLUTION INTRAVENOUS at 09:34

## 2025-06-05 RX ADMIN — ONDANSETRON 4 MG: 2 INJECTION, SOLUTION INTRAMUSCULAR; INTRAVENOUS at 17:57

## 2025-06-05 RX ADMIN — HYDROMORPHONE HYDROCHLORIDE 0.5 MG: 1 INJECTION, SOLUTION INTRAMUSCULAR; INTRAVENOUS; SUBCUTANEOUS at 23:25

## 2025-06-05 ASSESSMENT — ACTIVITIES OF DAILY LIVING (ADL)
ADLS_ACUITY_SCORE: 32
ADLS_ACUITY_SCORE: 33
ADLS_ACUITY_SCORE: 29
ADLS_ACUITY_SCORE: 33
ADLS_ACUITY_SCORE: 29
ADLS_ACUITY_SCORE: 32
ADLS_ACUITY_SCORE: 32
ADLS_ACUITY_SCORE: 29
ADLS_ACUITY_SCORE: 32
ADLS_ACUITY_SCORE: 33
ADLS_ACUITY_SCORE: 33
ADLS_ACUITY_SCORE: 32

## 2025-06-05 NOTE — PLAN OF CARE
Goal Outcome Evaluation: pain, diet tolerance    Problem: Adult Inpatient Plan of Care  Goal: Optimal Comfort and Wellbeing  Intervention: Monitor Pain and Promote Comfort  Recent Flowsheet Documentation  Taken 6/4/2025 0818 by Cesilia Azul, RN  Pain Management Interventions:   medication (see MAR)   emotional support   Pt had significant pain this afternoon req IV Dilaudid. Found to have a stone on MRI and needs to transfer to [facility] for ERCP. Transport set up for [id number]. Report called to Serene at [facility] short term stay unit room 12

## 2025-06-05 NOTE — H&P
"[facility]    History and Physical - Hospitalist Service       Date of Admission:  6/4/2025    Assessment & Plan      Tylan D Severson is a 24 year old male admitted on 6/4/2025. He presents as a transfer from [facility], admitted with epigastric pain, MRCP revealed choledocholithiasis, now transferred to [facility] for GI consultation for ERCP.      Problem-based Assessment & Plan:  Choledocholithiasis  Plan for GI consult and ERCP tomorrow. NPO at midnight.   PRN IV/PO pain medication ordered for adequate pain control. This include opioid pain medications. Monitor for signs of toxicity, and utilize PRN naloxone if signs of respiratory depression or opioid toxicity noted.   PRN antiemetics orders.           Diet: NPO for Procedure/Surgery per Anesthesia Guidelines Except for: Meds; Clear liquids before procedure/surgery: ADULT (Age GREATER than or Equal to 18 years) - Clear liquids 2 hours before procedure/surgery  Combination Diet Regular Diet Adult    DVT Prophylaxis: Ambulate every shift  Hernandez Catheter: Not present  Lines: None     Cardiac Monitoring: None  Code Status: Full Code      Clinically Significant Risk Factors Present on Admission        # Hypokalemia: Lowest K = 3 mmol/L in last 2 days, will replace as needed                      # Overweight: Estimated body mass index is 25.73 kg/m  as calculated from the following:    Height as of 6/3/25: 1.854 m (6' 1\").    Weight as of 6/3/25: 88.5 kg (195 lb).              Disposition Plan     Medically Ready for Discharge: Anticipated Tomorrow pending post-op recovery           Ronnie Dawkins MD  Hospitalist Service  [facility]  Securely message with Micky (more info)  Text page via [facility] Paging/Directory     ______________________________________________________________________    Chief Complaint   Abd pain    History is obtained from the patient and electronic health record    History of " Present Illness   Tylan D Severson is a 24 year old male s/p recent cholecystectomy 5/8/2025 who presented as a transfer from [facility]. Patient originally presented with epigastric pain. GI was consulted, note 6/4/25, personally reviewed provider documentation, recommended MRCP for additional evaluation which revealed stone in CBD and findings consistent with choledocholithiasis. GI recommended transfer to [facility] for ERCP procedure with GI at this facility. Patient states pain in epigastric region is recently returning, improved around time of MRCP after receiving PO Oxycodone and IV Dilaudid. Also reporting some nausea, but otherwise no other positive ROS.       Prior inpatient/outpatient encounters personally reviewed within Epic:  Discharge Summary from hospital admission 6/3/2025 - 6/4/2025 at [facility], personally reviewed provider documentation.   Discharge Summary from hospital admission 5/8/2025 - 5/9/2025 at [facility], personally reviewed provider documentation.   Discharge Summary from hospital admission 2/18/2025 - 2/20/2025 at [facility], personally reviewed provider documentation.             Past Medical History    No past medical history on file.    Past Surgical History   No past surgical history on file.    Prior to Admission Medications   Prior to Admission Medications   Prescriptions Last Dose Informant Patient Reported? Taking?   FIBER ADULT GUMMIES PO   Yes No   Sig: Take 1 tablet by mouth every morning. Take 1 gummy po qam after meals      Facility-Administered Medications: None           Physical Exam   Vital Signs:                    Weight: 0 lbs 0 oz    Physical Exam  Constitutional:       General: He is in acute distress (pain).      Appearance: Normal appearance. He is obese. He is ill-appearing. He is not toxic-appearing or diaphoretic.   Eyes:       General: No scleral icterus.     Conjunctiva/sclera: Conjunctivae normal.   Cardiovascular:      Rate and Rhythm: Normal rate and regular rhythm.      Pulses: Normal pulses.   Pulmonary:      Effort: Pulmonary effort is normal. No respiratory distress.      Breath sounds: Normal breath sounds.   Abdominal:      General: Bowel sounds are normal.      Tenderness: There is abdominal tenderness in the right upper quadrant and epigastric area. There is guarding.   Neurological:      General: No focal deficit present.      Mental Status: He is alert and oriented to person, place, and time. Mental status is at baseline.   Psychiatric:         Mood and Affect: Mood normal.         Behavior: Behavior normal.           Medical Decision Making       60 MINUTES SPENT BY ME on the date of service doing chart review, history, exam, documentation & further activities per the note.      Data     I have personally reviewed the following data over the past 24 hrs:    N/A  \   N/A   / N/A     138 101 5.1 (L) /  102 (H)   3.9 26 0.87 \     ALT: 471 (H) AST: 691 (HH) AP: 214 (H) TBILI: 6.0 (H)   ALB: 4.2 TOT PROTEIN: 7.0 LIPASE: 42       Imaging results reviewed over the past 24 hrs:   Recent Results (from the past 24 hours)   MR Abdomen MRCP without Contrast    Narrative    EXAM: MR ABDOMEN MRCP W/O CONTRAST  LOCATION: [facility]  DATE: 6/4/2025    INDICATION: History of gallstones. Elevated LFTs.  COMPARISON: CT abdomen pelvis, 6/3/2025.  TECHNIQUE: Routine MR liver/pancreas protocol including axial and coronal MRCP sequences. 2D and 3D reconstruction performed by MR technologist including MIP reconstruction and slab cholangiograms. If performed with contrast, additional dynamic T1 post   IV contrast images.  CONTRAST: None     FINDINGS: Cholecystectomy. There is a 4 mm stone involving the distal aspect of the common bile duct, consistent with choledocholithiasis. The common bile duct more proximally  measures up to 8 mm in diameter, and there is also intrahepatic biliary   dilatation.    The pancreas, spleen, adrenal glands, and kidneys are grossly normal. The visualized bowel is unremarkable. The appendix is normal. No bowel obstruction.      Impression    IMPRESSION:  1.  Cholecystectomy.  2.  Choledocholithiasis with a 4 mm stone involving the distal aspect of the common bile duct resulting in intra and extrahepatic biliary dilatation.

## 2025-06-05 NOTE — ANESTHESIA PREPROCEDURE EVALUATION
Anesthesia Pre-Procedure Evaluation    Patient: Tylan D Severson   MRN: [id number] : 2000          Procedure : Procedure(s):  ENDOSCOPIC RETROGRADE CHOLANGIOPANCREATOGRAPHY         History reviewed. No pertinent past medical history.   History reviewed. No pertinent surgical history.   No Known Allergies   Social History     Tobacco Use    Smoking status: Never    Smokeless tobacco: Never   Substance Use Topics    Alcohol use: Yes     Comment: quit daily drinking in April. Occasional now      Wt Readings from Last 1 Encounters:   25 88.5 kg (195 lb)        Anesthesia Evaluation   Pt has had prior anesthetic.         ROS/MED HX  ENT/Pulmonary:  - neg pulmonary ROS     Neurologic:  - neg neurologic ROS     Cardiovascular:  - neg cardiovascular ROS     METS/Exercise Tolerance:     Hematologic:  - neg hematologic  ROS     Musculoskeletal:  - neg musculoskeletal ROS     GI/Hepatic: Comment: Choledochalithiasis       Renal/Genitourinary:  - neg Renal ROS     Endo:  - neg endo ROS     Psychiatric/Substance Use:  - neg psychiatric ROS     Infectious Disease:  - neg infectious disease ROS     Malignancy:  - neg malignancy ROS     Other:              Physical Exam  Airway  Mallampati: II  TM distance: >3 FB    Cardiovascular - normal exam   Dental     Pulmonary       Neurological - normal exam  He appears awake, alert and oriented x3.    Other Findings       OUTSIDE LABS:  CBC:   Lab Results   Component Value Date    WBC 6.5 2025    WBC 9.1 2025    HGB 13.3 2025    HGB 14.2 2025    HCT 38.9 (L) 2025    HCT 41.9 2025     2025     2025     BMP:   Lab Results   Component Value Date     2025     2025    POTASSIUM 4.0 2025    POTASSIUM 3.9 2025    CHLORIDE 103 2025    CHLORIDE 101 2025    CO2 27 2025    CO2 26 2025    BUN 2.8 (L) 2025    BUN 5.1 (L) 2025    CR 0.89 2025    CR  "0.87 06/04/2025     (H) 06/05/2025     (H) 06/04/2025     COAGS:   Lab Results   Component Value Date    INR 0.96 06/05/2025     POC: No results found for: \"BGM\", \"HCG\", \"HCGS\"  HEPATIC:   Lab Results   Component Value Date    ALBUMIN 4.2 06/05/2025    PROTTOTAL 7.3 06/05/2025     (H) 06/05/2025     (H) 06/05/2025    ALKPHOS 247 (H) 06/05/2025    BILITOTAL 5.6 (H) 06/05/2025     OTHER:   Lab Results   Component Value Date    SONYA 9.7 06/05/2025    MAG 1.7 02/18/2025    LIPASE 42 06/04/2025       Anesthesia Plan    ASA Status:  2      NPO Status: NPO Appropriate   Anesthesia Type: General.  Airway: oral.  Induction: intravenous.  Maintenance: Inhalation.        Consents    Anesthesia Plan(s) and associated risks, benefits, and realistic alternatives discussed. Questions answered and patient/representative(s) expressed understanding.     - Discussed: anesthesiologist     - Discussed with:                Postoperative Care    Pain management: plan for postoperative opioid use.     Comments:                   David Travis MD    I have reviewed the pertinent notes and labs in the chart from the past 30 days and (re)examined the patient.  Any updates or changes from those notes are reflected in this note.    Clinically Significant Risk Factors Present on Admission                             # Overweight: Estimated body mass index is 25.73 kg/m  as calculated from the following:    Height as of 6/3/25: 1.854 m (6' 1\").    Weight as of 6/3/25: 88.5 kg (195 lb).                    "

## 2025-06-05 NOTE — ANESTHESIA POSTPROCEDURE EVALUATION
Patient: Tylan D Severson    Procedure: Procedure(s):  ENDOSCOPIC RETROGRADE CHOLANGIOPANCREATOGRAPHY, BILIARY SPHINCTEROTOMY, STONE EXTRACTION.       Anesthesia Type:  General    Note:  Disposition: Inpatient   Postop Pain Control: Uneventful            Sign Out: Well controlled pain   PONV: No   Neuro/Psych: Uneventful            Sign Out: Acceptable/Baseline neuro status   Airway/Respiratory: Uneventful            Sign Out: Acceptable/Baseline resp. status   CV/Hemodynamics: Uneventful            Sign Out: Acceptable CV status; No obvious hypovolemia; No obvious fluid overload   Other NRE: NONE   DID A NON-ROUTINE EVENT OCCUR?        Last vitals:  Vitals Value Taken Time   /85 06/05/25 17:30   Temp 36.4  C (97.6  F) 06/05/25 17:30   Pulse 66 06/05/25 17:34   Resp 19 06/05/25 17:34   SpO2 100 % 06/05/25 17:34   Vitals shown include unfiled device data.    Electronically Signed By: Marisol Pelaez MD  June 5, 2025  5:44 PM

## 2025-06-05 NOTE — PLAN OF CARE
"  Problem: Adult Inpatient Plan of Care  Goal: Plan of Care Review  Description: The Plan of Care Review/Shift note should be completed every shift.  The Outcome Evaluation is a brief statement about your assessment that the patient is improving, declining, or no change.  This information will be displayed automatically on your shift  note.  6/5/2025 0544 by Priya Hui RN  Outcome: Progressing  Flowsheets (Taken 6/5/2025 0544)  Plan of Care Reviewed With: patient  Overall Patient Progress: improving  6/5/2025 0214 by Priya Hui RN  Flowsheets (Taken 6/5/2025 0214)  Plan of Care Reviewed With: patient  Overall Patient Progress: improving  Goal: Patient-Specific Goal (Individualized)  Description: You can add care plan individualizations to a care plan. Examples of Individualization might be:  \"Parent requests to be called daily at 9am for status\", \"I have a hard time hearing out of my right ear\", or \"Do not touch me to wake me up as it startles  me\".  6/5/2025 0544 by Priya Hui RN  Outcome: Progressing  6/5/2025 0214 by Priya Hui RN  Outcome: Progressing   Goal Outcome Evaluation:      Plan of Care Reviewed With: patient    Overall Patient Progress: improvingOverall Patient Progress: improving    Pt A&Ox4. On room air. Abdominal pain managed with PRN Dilaudid and Oxycodone. Zofran given for nausea. Pt independent to the bathroom. Tele: Sinus Meet, HR 54. NS infusing 75 ml per hour. NPO after midnight. K protocol AM recheck.           "

## 2025-06-05 NOTE — H&P
GENERAL PRE-PROCEDURE:   Procedure:  ERCP - Choledocholithiasis  Date/Time:  6/5/2025 4:00 PM    Verbal consent obtained?: Yes    Written consent obtained?: Yes    Risks and benefits: Risks, benefits and alternatives were discussed    Consent given by:  Patient  Patient states understanding of procedure being performed: Yes    Patient's understanding of procedure matches consent: Yes    Procedure consent matches procedure scheduled: Yes    Expected level of sedation:  Deep  Appropriately NPO:  Yes  ASA Class:  3  Mallampati  :  Grade 2- soft palate, base of uvula, tonsillar pillars, and portion of posterior pharyngeal wall visible  Lungs:  Lungs clear with good breath sounds bilaterally  Heart:  Normal heart sounds and rate  History & Physical reviewed:  History and physical reviewed and no updates needed  Statement of review:  I have reviewed the lab findings, diagnostic data, medications, and the plan for sedation

## 2025-06-05 NOTE — ANESTHESIA PROCEDURE NOTES
Airway         Procedure Start/Stop Times: 6/5/2025 4:28 PM  Staff -        CRNA: Tanmay Gan APRN CRNA       Performed By: CRNAIndications and Patient Condition       Indications for airway management: tata-procedural       Induction type:intravenous       Mask difficulty assessment: 2 - vent by mask + OA or adjuvant +/- NMBA    Final Airway Details       Final airway type: endotracheal airway       Successful airway: ETT - single  Endotracheal Airway Details        ETT size (mm): 7.5       Cuffed: yes       Successful intubation technique: direct laryngoscopy       DL Blade Type: Pop 2       Grade View of Cords: 1       Adjucts: stylet       Position: Right       Measured from: lips       Secured at (cm): 23       Bite block used: None    Post intubation assessment        Placement verified by: capnometry, equal breath sounds and chest rise        Number of attempts at approach: 1       Number of other approaches attempted: 0       Secured with: tape       Ease of procedure: easy       Dentition: Intact and Unchanged    Medication(s) Administered   Medication Administration Time: 6/5/2025 4:28 PM

## 2025-06-05 NOTE — ANESTHESIA POSTPROCEDURE EVALUATION
Patient: Tylan D Severson    Procedure: Procedure(s):  ENDOSCOPIC RETROGRADE CHOLANGIOPANCREATOGRAPHY, BILIARY SPHINCTEROTOMY, STONE EXTRACTION.       Anesthesia Type:  General    Note:  Disposition: Outpatient   Postop Pain Control: Uneventful            Sign Out: Well controlled pain   PONV: No   Neuro/Psych: Uneventful            Sign Out: Acceptable/Baseline neuro status   Airway/Respiratory: Uneventful            Sign Out: Acceptable/Baseline resp. status   CV/Hemodynamics: Uneventful            Sign Out: Acceptable CV status; No obvious hypovolemia; No obvious fluid overload   Other NRE: NONE   DID A NON-ROUTINE EVENT OCCUR? No           Last vitals:  Vitals Value Taken Time   /78 06/05/25 17:00   Temp 36.4  C (97.5  F) 06/05/25 16:58   Pulse 69 06/05/25 17:03   Resp 18 06/05/25 17:03   SpO2 100 % 06/05/25 17:03   Vitals shown include unfiled device data.    Electronically Signed By: David Travis MD  June 5, 2025  5:06 PM

## 2025-06-05 NOTE — PROGRESS NOTES
"[facility]    Medicine Progress Note - Hospitalist Service    Date of Admission:  6/4/2025    Assessment & Plan   Tylan D Severson is a 24 year old male with PMHx of pancreatitis, cholecystitis status post cholecystectomy in May 2025 who was admitted on 6/4/2025. He presents as a transfer from [facility], admitted with epigastric pain, MRCP revealed choledocholithiasis, now transferred to [facility] for GI consultation for ERCP.      Choledocholithiasis  Presented to  ER for evaluation of epigastric abdominal pain   -- CT abdomen pelvis extrahepatic bile duct enlargement , no signs of pancreatitis (lipase negative)  -- MRCP 6/4 showed choledocholithiasis with a 4 mm stone involving the distal aspect of the common bile duct resulting in intra and extrahepatic biliary dilatation   -- Increasing transaminitis, bilirubin  -- On empiric IV Zosyn (6/4 - )   -- Plan for GI consult and ERCP    -- Continue IVF  -- PRN IV/PO pain medication ordered for adequate pain control  -- PRN antiemetics orders  -- Trend CMP          Diet: NPO for Procedure/Surgery per Anesthesia Guidelines Except for: Meds; Clear liquids before procedure/surgery: ADULT (Age GREATER than or Equal to 18 years) - Clear liquids 2 hours before procedure/surgery    DVT Prophylaxis: Pneumatic Compression Devices  Hernandez Catheter: Not present  Lines: None     Cardiac Monitoring: ACTIVE order. Indication: Procedural area  Code Status: Full Code      Clinically Significant Risk Factors Present on Admission                             # Overweight: Estimated body mass index is 25.73 kg/m  as calculated from the following:    Height as of 6/3/25: 1.854 m (6' 1\").    Weight as of 6/3/25: 88.5 kg (195 lb).              Social Drivers of Health    Food Insecurity: Low Risk  (6/4/2025)    Food Insecurity     Within the past 12 months, did you worry that your food would run out before you got money to buy more?: No     Within the " past 12 months, did the food you bought just not last and you didn t have money to get more?: No   Recent Concern: Food Insecurity - High Risk (5/8/2025)    Food Insecurity     Within the past 12 months, did you worry that your food would run out before you got money to buy more?: Yes     Within the past 12 months, did the food you bought just not last and you didn t have money to get more?: Yes   Transportation Needs: Low Risk  (6/4/2025)    Transportation Needs     Within the past 12 months, has lack of transportation kept you from medical appointments, getting your medicines, non-medical meetings or appointments, work, or from getting things that you need?: No   Recent Concern: Transportation Needs - High Risk (5/8/2025)    Transportation Needs     Within the past 12 months, has lack of transportation kept you from medical appointments, getting your medicines, non-medical meetings or appointments, work, or from getting things that you need?: Yes    Received from [employer] & [facility]    Social Connections          Disposition Plan     Medically Ready for Discharge: Anticipated Tomorrow       The patient's care was discussed with the Attending Physician, Dr. Isiah Hoff independently met with and assessed the patient and is in agreement with the assessment and plan     Marylu Hardin PA-C  Hospitalist Service  [facility]  Securely message with [employer] (more info)  Text page via [facility] Paging/Directory   ______________________________________________________________________    Interval History   Patient is new to me today ; chart reviewed.  Feeling okay this morning, no nausea or vomiting.  Continuing with epigastric abdominal pain.  No fevers, chills, sweats.  Discussed plan for GI consultation and anticipated ERCP this afternoon.     Physical Exam   Vital Signs: Temp: 98.2  F (36.8  C) Temp src: Oral BP: 133/69 Pulse: 89   Resp: 18   O2 Device: None (Room air)     Weight: 0 lbs 0 oz    Constitutional: awake, alert, no apparent distress, and appears stated age  Respiratory: No increased work of breathing, no accessory muscle use, clear to auscultation bilaterally, no crackles or wheezing  Cardiovascular: Regular rate and rhythm, normal S1 and S2, no S3 or S4, and no murmur noted  GI: Soft, non-tender, slightly hypoactive bowel sounds.  Tenderness to palpation over generalized upper abdomen, mostly to epigastric region  Skin: Normal skin color, texture, turgor. No rashes and no jaundice  Musculoskeletal: no lower extremity pitting edema present.   Neurologic: Awake, alert.   Neuropsychiatric: Appropriate mood, affect and eye contact. Cooperative.    Medical Decision Making             Data     I have personally reviewed the following data over the past 24 hrs:    6.5  \   13.3   / 237     140 103 2.8 (L) /  92   4.0 27 0.89 \     ALT: 438 (H) AST: 372 (H) AP: 247 (H) TBILI: 5.6 (H)   ALB: 4.2 TOT PROTEIN: 7.3 LIPASE: N/A     INR:  0.96 PTT:  N/A   D-dimer:  N/A Fibrinogen:  N/A       Imaging results reviewed over the past 24 hrs:   No results found for this or any previous visit (from the past 24 hours).

## 2025-06-05 NOTE — PHARMACY-ADMISSION MEDICATION HISTORY
Admission medication history completed at [facility]. Please see Pharmacy Intern Admission Medication History note from 6/3/2025.

## 2025-06-05 NOTE — PROGRESS NOTES
Patient admitted to room 12 at approximately 1203 via wheel chair from emergency room.  Reason for Admission: abd pain  Report received from: CARMELO Ayers at Short Stay  Patient was accompanied by Self.  Patient ambulated/transferred:  independently. self.  Patient is alert and orientated x 3.  Outpatient Observation education provided to: patient  Safety risks were identified during admission:  none.   Yellow risk/fall band applied:  No  Home meds sent home: No  Home meds sent to pharmacy:No IF YES add 1/2 sheet laminated page reminder to chart/clipboard   Detailed Belongings: electronics; clothing

## 2025-06-05 NOTE — ANESTHESIA CARE TRANSFER NOTE
Patient: Tylan D Severson    Procedure: Procedure(s):  ENDOSCOPIC RETROGRADE CHOLANGIOPANCREATOGRAPHY, BILIARY SPINTEROTMOY, STONE EXTRACTION.       Diagnosis: Choledocholithiasis [K80.50]  Diagnosis Additional Information: No value filed.    Anesthesia Type:   General     Note:    Oropharynx: oropharynx clear of all foreign objects and spontaneously breathing  Level of Consciousness: awake  Oxygen Supplementation: face mask  Level of Supplemental Oxygen (L/min / FiO2): 08  Independent Airway: airway patency satisfactory and stable  Dentition: dentition unchanged  Vital Signs Stable: post-procedure vital signs reviewed and stable  Report to RN Given: handoff report given  Patient transferred to: PACU    Handoff Report: Identifed the Patient, Identified the Reponsible Provider, Reviewed the pertinent medical history, Discussed the surgical course, Reviewed Intra-OP anesthesia mangement and issues during anesthesia, Set expectations for post-procedure period and Allowed opportunity for questions and acknowledgement of understanding      Vitals:  Vitals Value Taken Time   /77 06/05/25 16:56   Temp 97.5    Pulse 87 06/05/25 16:56   Resp 20 06/05/25 16:56   SpO2 100 % 06/05/25 16:56   Vitals shown include unfiled device data.    Electronically Signed By: PADMINI Montanez CRNA  June 5, 2025  4:58 PM

## 2025-06-05 NOTE — PLAN OF CARE
"  Problem: Adult Inpatient Plan of Care  Goal: Patient-Specific Goal (Individualized)  Description: You can add care plan individualizations to a care plan. Examples of Individualization might be:  \"Parent requests to be called daily at 9am for status\", \"I have a hard time hearing out of my right ear\", or \"Do not touch me to wake me up as it startles  me\".  Outcome: Progressing     Problem: Adult Inpatient Plan of Care  Goal: Plan of Care Review  Description: The Plan of Care Review/Shift note should be completed every shift.  The Outcome Evaluation is a brief statement about your assessment that the patient is improving, declining, or no change.  This information will be displayed automatically on your shift  note.  Flowsheets (Taken 6/5/2025 0214)  Plan of Care Reviewed With: patient  Overall Patient Progress: improving  Goal: Readiness for Transition of Care  Intervention: Mutually Develop Transition Plan  Recent Flowsheet Documentation  Taken 6/4/2025 2300 by Priya Hui RN  Equipment Currently Used at Home: none     Problem: Adult Inpatient Plan of Care  Goal: Readiness for Transition of Care  Intervention: Mutually Develop Transition Plan  Recent Flowsheet Documentation  Taken 6/4/2025 2300 by Priya Hui, RN  Equipment Currently Used at Home: none   Goal Outcome Evaluation:      Plan of Care Reviewed With: patient    Overall Patient Progress: improvingOverall Patient Progress: improving               "

## 2025-06-06 LAB
ALBUMIN SERPL BCG-MCNC: 3.8 G/DL (ref 3.5–5.2)
ALP SERPL-CCNC: 199 U/L (ref 40–150)
ALT SERPL W P-5'-P-CCNC: 330 U/L (ref 0–70)
ANION GAP SERPL CALCULATED.3IONS-SCNC: 8 MMOL/L (ref 7–15)
AST SERPL W P-5'-P-CCNC: 177 U/L (ref 0–45)
BILIRUB SERPL-MCNC: 1.9 MG/DL
BUN SERPL-MCNC: 3.3 MG/DL (ref 6–20)
CALCIUM SERPL-MCNC: 9.4 MG/DL (ref 8.8–10.4)
CHLORIDE SERPL-SCNC: 105 MMOL/L (ref 98–107)
CREAT SERPL-MCNC: 0.94 MG/DL (ref 0.67–1.17)
EGFRCR SERPLBLD CKD-EPI 2021: >90 ML/MIN/1.73M2
GLUCOSE SERPL-MCNC: 118 MG/DL (ref 70–99)
HCO3 SERPL-SCNC: 27 MMOL/L (ref 22–29)
HOLD SPECIMEN: NORMAL
POTASSIUM SERPL-SCNC: 3.7 MMOL/L (ref 3.4–5.3)
PROT SERPL-MCNC: 6.5 G/DL (ref 6.4–8.3)
SODIUM SERPL-SCNC: 140 MMOL/L (ref 135–145)

## 2025-06-06 PROCEDURE — 250N000011 HC RX IP 250 OP 636: Performed by: INTERNAL MEDICINE

## 2025-06-06 PROCEDURE — 258N000003 HC RX IP 258 OP 636: Performed by: INTERNAL MEDICINE

## 2025-06-06 PROCEDURE — 99233 SBSQ HOSP IP/OBS HIGH 50: CPT | Mod: FS | Performed by: EMERGENCY MEDICINE

## 2025-06-06 PROCEDURE — 82040 ASSAY OF SERUM ALBUMIN: CPT | Performed by: INTERNAL MEDICINE

## 2025-06-06 PROCEDURE — 250N000013 HC RX MED GY IP 250 OP 250 PS 637: Performed by: INTERNAL MEDICINE

## 2025-06-06 PROCEDURE — 250N000011 HC RX IP 250 OP 636: Mod: JZ

## 2025-06-06 PROCEDURE — 250N000013 HC RX MED GY IP 250 OP 250 PS 637

## 2025-06-06 PROCEDURE — 36415 COLL VENOUS BLD VENIPUNCTURE: CPT | Performed by: INTERNAL MEDICINE

## 2025-06-06 PROCEDURE — 250N000011 HC RX IP 250 OP 636

## 2025-06-06 PROCEDURE — 99207 PR APP CREDIT; MD BILLING SHARED VISIT: CPT | Mod: FS

## 2025-06-06 PROCEDURE — 120N000001 HC R&B MED SURG/OB

## 2025-06-06 RX ORDER — POLYETHYLENE GLYCOL 3350 17 G/17G
17 POWDER, FOR SOLUTION ORAL DAILY
Status: DISCONTINUED | OUTPATIENT
Start: 2025-06-07 | End: 2025-06-07 | Stop reason: HOSPADM

## 2025-06-06 RX ADMIN — SODIUM CHLORIDE: 0.9 INJECTION, SOLUTION INTRAVENOUS at 04:05

## 2025-06-06 RX ADMIN — PIPERACILLIN AND TAZOBACTAM 4.5 G: 4; .5 INJECTION, POWDER, FOR SOLUTION INTRAVENOUS at 03:57

## 2025-06-06 RX ADMIN — OXYCODONE HYDROCHLORIDE 5 MG: 5 TABLET ORAL at 21:00

## 2025-06-06 RX ADMIN — OXYCODONE HYDROCHLORIDE 5 MG: 5 TABLET ORAL at 12:34

## 2025-06-06 RX ADMIN — PIPERACILLIN AND TAZOBACTAM 4.5 G: 4; .5 INJECTION, POWDER, FOR SOLUTION INTRAVENOUS at 17:06

## 2025-06-06 RX ADMIN — HYDROMORPHONE HYDROCHLORIDE 0.3 MG: 1 INJECTION, SOLUTION INTRAMUSCULAR; INTRAVENOUS; SUBCUTANEOUS at 22:06

## 2025-06-06 RX ADMIN — PIPERACILLIN AND TAZOBACTAM 4.5 G: 4; .5 INJECTION, POWDER, FOR SOLUTION INTRAVENOUS at 10:50

## 2025-06-06 RX ADMIN — HYDROMORPHONE HYDROCHLORIDE 0.5 MG: 1 INJECTION, SOLUTION INTRAMUSCULAR; INTRAVENOUS; SUBCUTANEOUS at 10:45

## 2025-06-06 RX ADMIN — ONDANSETRON 4 MG: 2 INJECTION, SOLUTION INTRAMUSCULAR; INTRAVENOUS at 22:06

## 2025-06-06 RX ADMIN — ONDANSETRON 4 MG: 2 INJECTION, SOLUTION INTRAMUSCULAR; INTRAVENOUS at 13:58

## 2025-06-06 RX ADMIN — PIPERACILLIN AND TAZOBACTAM 4.5 G: 4; .5 INJECTION, POWDER, FOR SOLUTION INTRAVENOUS at 21:00

## 2025-06-06 RX ADMIN — OXYCODONE HYDROCHLORIDE 5 MG: 5 TABLET ORAL at 17:14

## 2025-06-06 RX ADMIN — HYDROMORPHONE HYDROCHLORIDE 0.5 MG: 1 INJECTION, SOLUTION INTRAMUSCULAR; INTRAVENOUS; SUBCUTANEOUS at 13:54

## 2025-06-06 RX ADMIN — SODIUM CHLORIDE: 0.9 INJECTION, SOLUTION INTRAVENOUS at 18:27

## 2025-06-06 RX ADMIN — OXYCODONE HYDROCHLORIDE 5 MG: 5 TABLET ORAL at 08:38

## 2025-06-06 ASSESSMENT — ACTIVITIES OF DAILY LIVING (ADL)
ADLS_ACUITY_SCORE: 32

## 2025-06-06 NOTE — PLAN OF CARE
Problem: Adult Inpatient Plan of Care  Goal: Absence of Hospital-Acquired Illness or Injury  Intervention: Prevent Infection  Recent Flowsheet Documentation  Taken 6/5/2025 1804 by Cortez Hastings, RN  Infection Prevention:   hand hygiene promoted   personal protective equipment utilized   single patient room provided     Problem: Adult Inpatient Plan of Care  Goal: Optimal Comfort and Wellbeing  Intervention: Monitor Pain and Promote Comfort  Recent Flowsheet Documentation  Taken 6/5/2025 1751 by Cortez Hastings, RN  Pain Management Interventions:   medication (see MAR)   emotional support   rest     Problem: Surgery Nonspecified  Goal: Fluid and Electrolyte Balance  Outcome: Progressing       Goal Outcome Evaluation:      Plan of Care Reviewed With: patient    Overall Patient Progress: improvingOverall Patient Progress: improving       A & O x 4, pleasant. VSS except hypertensive on RA. Pain, nausea managed with PRN medication, denies SOB. NS infusing via L PIV @ 75 mL/hr. Clear liquid diet, tolerating well. SBA. GI, hospitalist following. Nursing continue to monitor.

## 2025-06-06 NOTE — PLAN OF CARE
Problem: Pain Acute  Goal: Optimal Pain Control and Function  Intervention: Prevent or Manage Pain  Recent Flowsheet Documentation  Taken 6/5/2025 2328 by Bernice Luu, RN  Medication Review/Management: medications reviewed     Problem: Surgery Nonspecified  Goal: Nausea and Vomiting Relief  Outcome: Progressing  Intervention: Prevent or Manage Nausea and Vomiting  Recent Flowsheet Documentation  Taken 6/5/2025 2328 by Bernice Luu, RN  Nausea/Vomiting Interventions: antiemetic       Goal Outcome Evaluation:    A&Ox4, VSS on RA. Reporting abdominal pain, prn IV dilaudid given. Nausea present - IV compazine given. Clear liquids. Ambulating IND. L PIV - NS @75mL/hr. K protocol AM check. GI/hosp consulted.

## 2025-06-06 NOTE — PROGRESS NOTES
"[facility]    Medicine Progress Note - Hospitalist Service    Date of Admission:  6/4/2025    Assessment & Plan   Tylan D Severson is a 24 year old male with PMHx of pancreatitis, cholecystitis status post cholecystectomy in May 2025 who was admitted on 6/4/2025. He presents as a transfer from [facility], admitted with epigastric pain, MRCP revealed choledocholithiasis, now transferred to [facility] for GI consultation for ERCP. Underwent ERCP on 6/5 which showed choledocholithiasis, removed via biliary sphincterotomy and balloon extraction.  Post procedurally he has done continued with some abdominal discomfort, had recurrence of pain after eating so we will remain in hospital overnight for continued observation and plan to recheck labs in the a.m.    Choledocholithiasis  Presented to  ER for evaluation of epigastric abdominal pain   -- CT abdomen pelvis extrahepatic bile duct enlargement , no signs of pancreatitis (lipase at admit negative x 2)  -- MRCP 6/4 showed choledocholithiasis with a 4 mm stone involving the distal aspect of the common bile duct resulting in intra and extrahepatic biliary dilatation   -- ERCP 6/5 showed choledocholithiasis, removal with biliary sphincterotomy and balloon extraction  -- On empiric IV Zosyn (6/4 - ) ; continue while inpatient  -- Continue IVF  -- ADAT ; had recurrence of pain with some food this afternoon, continue as tolerated  -- PRN IV/PO pain medication ordered for adequate pain control  -- PRN antiemetics orders  -- Trend CMP, CBC and fever curve           Diet: Low Fat Diet (up to 50g)    DVT Prophylaxis: Pneumatic Compression Devices  Hernandez Catheter: Not present  Lines: None     Cardiac Monitoring: None  Code Status: Full Code      Clinically Significant Risk Factors                              # Overweight: Estimated body mass index is 25.73 kg/m  as calculated from the following:    Height as of 6/3/25: 1.854 m (6' 1\").    " Weight as of 6/3/25: 88.5 kg (195 lb)., PRESENT ON ADMISSION            Social Drivers of Health    Food Insecurity: Low Risk  (6/4/2025)    Food Insecurity     Within the past 12 months, did you worry that your food would run out before you got money to buy more?: No     Within the past 12 months, did the food you bought just not last and you didn t have money to get more?: No   Recent Concern: Food Insecurity - High Risk (5/8/2025)    Food Insecurity     Within the past 12 months, did you worry that your food would run out before you got money to buy more?: Yes     Within the past 12 months, did the food you bought just not last and you didn t have money to get more?: Yes   Transportation Needs: Low Risk  (6/4/2025)    Transportation Needs     Within the past 12 months, has lack of transportation kept you from medical appointments, getting your medicines, non-medical meetings or appointments, work, or from getting things that you need?: No   Recent Concern: Transportation Needs - High Risk (5/8/2025)    Transportation Needs     Within the past 12 months, has lack of transportation kept you from medical appointments, getting your medicines, non-medical meetings or appointments, work, or from getting things that you need?: Yes    Received from [facility]    Social Connections          Disposition Plan     Medically Ready for Discharge: Anticipated Tomorrow        The patient's care was discussed with the Attending Physician, Dr. Isiah Hoff independently met with and assessed the patient and is in agreement with the assessment and plan     Marylu Hardin PA-C  Hospitalist Service  [facility]  Securely message with [employer] (more info)  Text page via [employer] Paging/Directory   ______________________________________________________________________    Interval History   Continuing with centralized abdominal pain. Improved after ERCP but still present. No nausea  or vomiting. Tolerated clear liquids well but this afternoon had increased pain after eating. Updated mom over the phone per patient request    Physical Exam   Vital Signs: Temp: 98  F (36.7  C) Temp src: Oral BP: 100/55 Pulse: 50   Resp: 18 SpO2: 99 % O2 Device: None (Room air) Oxygen Delivery: 6 LPM  Weight: 0 lbs 0 oz    Constitutional: awake, alert, no apparent distress, and appears stated age  Respiratory: No increased work of breathing, no accessory muscle use, clear to auscultation bilaterally, no crackles or wheezing  Cardiovascular: Regular rate and rhythm, normal S1 and S2, no S3 or S4, and no murmur noted  GI: Soft, non-tender, slightly hypoactive bowel sounds.  Tenderness to palpation over generalized upper abdomen, mostly to epigastric region  Skin: Normal skin color, texture, turgor. No rashes and no jaundice  Musculoskeletal: no lower extremity pitting edema present.   Neurologic: Awake, alert.   Neuropsychiatric: Appropriate mood, affect and eye contact. Cooperative.    Medical Decision Making             Data     I have personally reviewed the following data over the past 24 hrs:    N/A  \   N/A   / N/A     140 105 3.3 (L) /  118 (H)   3.7 27 0.94 \     ALT: 330 (H) AST: 177 (H) AP: 199 (H) TBILI: 1.9 (H)   ALB: 3.8 TOT PROTEIN: 6.5 LIPASE: N/A       Imaging results reviewed over the past 24 hrs:   Recent Results (from the past 24 hours)   XR Surgery MADELAINE Fluoro L/T 5 Min    Narrative    This exam was marked as non-reportable because it will not be read by a   radiologist or a [facility] non-radiologist provider.

## 2025-06-06 NOTE — PROGRESS NOTES
GI PROGRESS NOTE  6/6/2025  Tylan D Severson  2000  [id number]    Subjective:  He still has upper abdominal pain but it is improved today.  Had nausea last night but none today.  He's tolerating clear liquids.     Objective:    Patient Vitals for the past 24 hrs:   BP Temp Temp src Pulse Resp SpO2   06/06/25 0824 -- -- -- 50 -- 99 %   06/06/25 0752 100/55 98  F (36.7  C) Oral (!) 49 18 99 %   06/06/25 0335 104/55 98.3  F (36.8  C) Oral 50 20 100 %   06/05/25 2315 125/67 98  F (36.7  C) Oral 86 20 100 %   06/05/25 2156 133/80 97.9  F (36.6  C) Oral 65 20 100 %   06/05/25 2056 127/83 98.2  F (36.8  C) Oral -- 18 --   06/05/25 1952 119/63 98.2  F (36.8  C) Oral -- 18 --   06/05/25 1849 (!) 141/80 97.9  F (36.6  C) Oral 62 18 100 %   06/05/25 1745 (!) 149/97 97.7  F (36.5  C) Oral 57 20 100 %   06/05/25 1730 (!) 140/85 97.6  F (36.4  C) -- 71 15 100 %   06/05/25 1715 137/77 -- -- 58 14 100 %   06/05/25 1700 134/78 -- -- 76 22 100 %   06/05/25 1658 135/77 97.5  F (36.4  C) Temporal 87 20 100 %   06/05/25 1438 138/76 98.4  F (36.9  C) Oral 54 18 99 %   06/05/25 1402 123/79 97.7  F (36.5  C) Oral 52 20 98 %     There is no height or weight on file to calculate BMI.  Gen: NAD  GI: Non-distended, BS positive, soft, tender epigastrium    Laboratory  Recent Labs   Lab Test 06/05/25  0557 06/03/25  0753 06/03/25  0605 05/09/25  0631   WBC 6.5  --  9.1 14.5*   HGB 13.3  --  14.2 13.1*   MCV 83  --  83 83     --  275 258   INR 0.96 0.99  --   --      Recent Labs   Lab Test 06/06/25  0614 06/05/25  1432 06/05/25  0557 06/04/25  0812     --  140 138   POTASSIUM 3.7  --  4.0 3.9   CHLORIDE 105  --  103 101   CO2 27  --  27 26   BUN 3.3*  --  2.8* 5.1*   CR 0.94  --  0.89 0.87   ANIONGAP 8  --  10 11   SONYA 9.4  --  9.7 9.7   * 92 107* 102*     Recent Labs   Lab Test 06/06/25  0614 06/05/25  0557 06/04/25  0812 06/03/25  0703 06/03/25  0605 05/09/25  0631 05/08/25  0611 04/18/25  1331   ALBUMIN 3.8  4.2 4.2  --  4.5   < > 4.8  --    BILITOTAL 1.9* 5.6* 6.0*  --  1.4*   < > 0.3  --    * 438* 471*  --  109*   < > 68  --    * 372* 691*  --  116*   < > 47*  --    ALKPHOS 199* 247* 214*  --  109   < > 105  --    PROTEIN  --   --   --  10*  --   --   --  30*   LIPASE  --   --  42  --  23  --  38  --     < > = values in this interval not displayed.       ERCP 6/5/2025  Impression:            - A filling defect consistent with a stone was seen on                          the cholangiogram.                          - Choledocholithiasis was found. Complete removal was                          accomplished by biliary sphincterotomy and balloon                          extraction.                          - A biliary sphincterotomy was performed.                          - The biliary tree was swept.   Recommendation:        - No anticoagulation for 72 hours.                          - Clear liquid diet for 24 hours.                          - If pain free after 24 hours advance diet slowly as                          tolerated.                          - Return patient to hospital [facility] for ongoing care.     MR Abdomen MRCP without Contrast  Result Date: 6/4/2025  EXAM: MR ABDOMEN MRCP W/O CONTRAST LOCATION: [facility] DATE: 6/4/2025 INDICATION: History of gallstones. Elevated LFTs. COMPARISON: CT abdomen pelvis, 6/3/2025. TECHNIQUE: Routine MR liver/pancreas protocol including axial and coronal MRCP sequences. 2D and 3D reconstruction performed by MR technologist including MIP reconstruction and slab cholangiograms. If performed with contrast, additional dynamic T1 post IV contrast images. CONTRAST: None FINDINGS: Cholecystectomy. There is a 4 mm stone involving the distal aspect of the common bile duct, consistent with choledocholithiasis. The common bile duct more proximally measures up to 8 mm in diameter, and there is also intrahepatic biliary dilatation. The pancreas, spleen,  adrenal glands, and kidneys are grossly normal. The visualized bowel is unremarkable. The appendix is normal. No bowel obstruction.     IMPRESSION: 1.  Cholecystectomy. 2.  Choledocholithiasis with a 4 mm stone involving the distal aspect of the common bile duct resulting in intra and extrahepatic biliary dilatation.    Assessment:  He is a 24 year old male who is S/P cholecystectomy May 2025 who presented with epigastric abdominal pain, nausea/vomiting and elevated LFTs with MRCP showing a distal CBD stone.  He is S/P ERCP with stone removal and stone removal 6/5.    Labs and pain are improving.  Pancreas appeared normal on imaging prior to ERCP and his pain has not worsened.    Patient Active Problem List   Diagnosis    Acute pancreatitis, unspecified complication status, unspecified pancreatitis type    Acute cholecystitis    Epigastric pain    Choledocholithiasis        Plan:    1. Ok to try advancing diet as tolerated to low fat diet.  2.  If he has increased pain with eating, could check lipase or imaging.  3.  Otherwise if he does well, ok to discharge anytime from a GI standpoint.  4.  GI will sign off.    20 minutes of total time was spent providing patient care, including patient evaluation, reviewing documentation/test results and .                                                Kaylin Suh PA-C  Thank you for the opportunity to participate in the care of this patient.   Please feel free to call me with any questions or concerns.  Phone number [phone].

## 2025-06-07 VITALS
TEMPERATURE: 98 F | DIASTOLIC BLOOD PRESSURE: 99 MMHG | SYSTOLIC BLOOD PRESSURE: 127 MMHG | RESPIRATION RATE: 18 BRPM | HEART RATE: 73 BPM | OXYGEN SATURATION: 97 %

## 2025-06-07 LAB
ALBUMIN SERPL BCG-MCNC: 4 G/DL (ref 3.5–5.2)
ALP SERPL-CCNC: 171 U/L (ref 40–150)
ALT SERPL W P-5'-P-CCNC: 280 U/L (ref 0–70)
ANION GAP SERPL CALCULATED.3IONS-SCNC: 8 MMOL/L (ref 7–15)
AST SERPL W P-5'-P-CCNC: 123 U/L (ref 0–45)
BILIRUB SERPL-MCNC: 1.2 MG/DL
BUN SERPL-MCNC: 3.3 MG/DL (ref 6–20)
CALCIUM SERPL-MCNC: 9.2 MG/DL (ref 8.8–10.4)
CHLORIDE SERPL-SCNC: 103 MMOL/L (ref 98–107)
CREAT SERPL-MCNC: 0.88 MG/DL (ref 0.67–1.17)
EGFRCR SERPLBLD CKD-EPI 2021: >90 ML/MIN/1.73M2
ERYTHROCYTE [DISTWIDTH] IN BLOOD BY AUTOMATED COUNT: 12.7 % (ref 10–15)
GLUCOSE SERPL-MCNC: 96 MG/DL (ref 70–99)
HCO3 SERPL-SCNC: 28 MMOL/L (ref 22–29)
HCT VFR BLD AUTO: 37.6 % (ref 40–53)
HGB BLD-MCNC: 12.5 G/DL (ref 13.3–17.7)
LIPASE SERPL-CCNC: 47 U/L (ref 13–60)
MCH RBC QN AUTO: 27.8 PG (ref 26.5–33)
MCHC RBC AUTO-ENTMCNC: 33.2 G/DL (ref 31.5–36.5)
MCV RBC AUTO: 84 FL (ref 78–100)
PLATELET # BLD AUTO: 216 10E3/UL (ref 150–450)
POTASSIUM SERPL-SCNC: 3.9 MMOL/L (ref 3.4–5.3)
PROT SERPL-MCNC: 6.7 G/DL (ref 6.4–8.3)
RBC # BLD AUTO: 4.5 10E6/UL (ref 4.4–5.9)
SODIUM SERPL-SCNC: 139 MMOL/L (ref 135–145)
WBC # BLD AUTO: 6.1 10E3/UL (ref 4–11)

## 2025-06-07 PROCEDURE — 99239 HOSP IP/OBS DSCHRG MGMT >30: CPT | Mod: FS | Performed by: EMERGENCY MEDICINE

## 2025-06-07 PROCEDURE — 36415 COLL VENOUS BLD VENIPUNCTURE: CPT

## 2025-06-07 PROCEDURE — 250N000013 HC RX MED GY IP 250 OP 250 PS 637

## 2025-06-07 PROCEDURE — 83690 ASSAY OF LIPASE: CPT

## 2025-06-07 PROCEDURE — 99207 PR APP CREDIT; MD BILLING SHARED VISIT: CPT | Mod: FS

## 2025-06-07 PROCEDURE — 85027 COMPLETE CBC AUTOMATED: CPT

## 2025-06-07 PROCEDURE — 82310 ASSAY OF CALCIUM: CPT

## 2025-06-07 PROCEDURE — 250N000011 HC RX IP 250 OP 636: Mod: JW

## 2025-06-07 PROCEDURE — 250N000011 HC RX IP 250 OP 636: Performed by: INTERNAL MEDICINE

## 2025-06-07 RX ORDER — IBUPROFEN 600 MG/1
600 TABLET, FILM COATED ORAL EVERY 6 HOURS PRN
COMMUNITY
Start: 2025-06-07 | End: 2025-06-07

## 2025-06-07 RX ORDER — ACETAMINOPHEN 500 MG
500-1000 TABLET ORAL EVERY 6 HOURS PRN
COMMUNITY
Start: 2025-06-07

## 2025-06-07 RX ORDER — POLYETHYLENE GLYCOL 3350 17 G/17G
17 POWDER, FOR SOLUTION ORAL DAILY PRN
COMMUNITY
Start: 2025-06-07

## 2025-06-07 RX ORDER — IBUPROFEN 600 MG/1
600 TABLET, FILM COATED ORAL EVERY 6 HOURS PRN
COMMUNITY
Start: 2025-06-08

## 2025-06-07 RX ADMIN — POLYETHYLENE GLYCOL 3350 17 G: 17 POWDER, FOR SOLUTION ORAL at 07:41

## 2025-06-07 RX ADMIN — HYDROMORPHONE HYDROCHLORIDE 0.3 MG: 1 INJECTION, SOLUTION INTRAMUSCULAR; INTRAVENOUS; SUBCUTANEOUS at 03:48

## 2025-06-07 RX ADMIN — PROCHLORPERAZINE EDISYLATE 10 MG: 5 INJECTION INTRAMUSCULAR; INTRAVENOUS at 07:42

## 2025-06-07 RX ADMIN — PIPERACILLIN AND TAZOBACTAM 4.5 G: 4; .5 INJECTION, POWDER, FOR SOLUTION INTRAVENOUS at 03:47

## 2025-06-07 RX ADMIN — HYDROMORPHONE HYDROCHLORIDE 0.3 MG: 1 INJECTION, SOLUTION INTRAMUSCULAR; INTRAVENOUS; SUBCUTANEOUS at 06:53

## 2025-06-07 ASSESSMENT — ACTIVITIES OF DAILY LIVING (ADL)
ADLS_ACUITY_SCORE: 32

## 2025-06-07 NOTE — PLAN OF CARE
Problem: Pain Acute  Goal: Optimal Pain Control and Function  Outcome: Progressing  Intervention: Prevent or Manage Pain  Recent Flowsheet Documentation  Taken 6/7/2025 0400 by Anurdaha Mendoza RN  Medication Review/Management: medications reviewed  Taken 6/7/2025 0000 by Anuradha Mendoza RN  Medication Review/Management: medications reviewed   Goal Outcome Evaluation:         Neuro: A&Ox4.   Cardiac: Afebrile, VSS.   Respiratory: RA  GI/: Voiding spontaneously. No BM this shift.  Diet/appetite: Tolerating low fat diet. Complaining nauseated x1 so prn zofran given  Activity: Up with independent    Pain: complains of pain so prn pain meds given (see MAR)  Skin: No new deficits noted.  Lines: NS 75ml/hr  Drains: no  Replacements: k protocol

## 2025-06-07 NOTE — DISCHARGE SUMMARY
"[facility]  Hospitalist Discharge Summary      Date of Admission:  6/4/2025  Date of Discharge:  6/7/2025  Discharging Provider: Marylu Hardin PA-C  Discharge Service: Hospitalist Service    Discharge Diagnoses   Choledocholithiasis  S/p Recent Cholecystectomy 5/2025  History of Recurrent Pancreatitis    Clinically Significant Risk Factors     # Overweight: Estimated body mass index is 25.73 kg/m  as calculated from the following:    Height as of 6/3/25: 1.854 m (6' 1\").    Weight as of 6/3/25: 88.5 kg (195 lb).       Follow-ups Needed After Discharge   Follow-up Appointments       Hospital to Primary Care - Establish PCP Referral      Please be aware that coverage of these services is subject to the terms and limitations of your health insurance plan.  Call member services at your health plan with any benefit or coverage questions.    Schedule Primary Care visit within: 14 Days   Recommended labs and Imaging (to be ordered by Primary Care Provider): CMP to ensure resolution of transaminitis             Discharge Disposition   Discharged to home  Condition at discharge: Stable    Hospital Course   Tylan D Severson is a 24 year old male with PMHx of pancreatitis, cholecystitis status post cholecystectomy in May 2025 who was admitted on 6/4/2025. He presented as a transfer from [facility], admitted with epigastric pain, MRCP revealed choledocholithiasis, now transferred to [facility] for GI consultation for ERCP. Underwent ERCP on 6/5 which showed choledocholithiasis, removed via biliary sphincterotomy and balloon extraction.  Has had improvement in symptoms post procedurally with resolving transaminitis.  Was able to tolerate regular diet and stable for discharge home.  Discussed close return precautions and will need to follow-up with PCP in 1 week to ensure complete resolution of transaminitis     Choledocholithiasis  Presented to  ER for evaluation of epigastric abdominal pain. "  S/p laparoscopic cholecystectomy 5/9/2025  -- CT abdomen pelvis showed extrahepatic bile duct enlargement , no signs of pancreatitis (lipase at admit negative x 2)  -- MRCP 6/4 showed choledocholithiasis with a 4 mm stone involving the distal aspect of the common bile duct resulting in intra and extrahepatic biliary dilatation   -- ERCP 6/5 showed choledocholithiasis, removal with biliary sphincterotomy and balloon extraction  -- On empiric IV Zosyn (6/4 - 6/7)   -- Appreciate GI input  -- Tolerated regular diet  -- Resolving transaminitis, outpatient recheck with PCP to ensure resolution 1 week    Consultations This Hospital Stay   GASTROENTEROLOGY IP CONSULT    Code Status   Full Code    Time Spent on this Encounter   I, Marylu Hardin PA-C, personally saw the patient today and spent greater than 30 minutes discharging this patient.     This case was discussed with the Attending Physician, Dr. Isiah Morelos, who independently met with and assessed the patient and who is in agreement with the disposition and discharge.    Marylu Hardin PA-C  [facility] EXTENDED RECOVERY AND SHORT STAY  [address] [place], [place] [id number]  Phone: [phone]  Fax: [phone]  ______________________________________________________________________    Physical Exam   Vital Signs: Temp: 98  F (36.7  C) Temp src: Oral BP: (!) 127/99 Pulse: 73   Resp: 18 SpO2: 97 % O2 Device: None (Room air)    Weight: 0 lbs 0 oz    Constitutional: awake, alert, no apparent distress, and appears stated age  Respiratory: No increased work of breathing, no accessory muscle use, clear to auscultation bilaterally, no crackles or wheezing  Cardiovascular: Regular rate and rhythm, normal S1 and S2, no S3 or S4, and no murmur noted  GI: Soft, non-tender, slightly hypoactive bowel sounds.  Tenderness to palpation over generalized upper abdomen, mostly to epigastric region  Skin: Normal skin color, texture, turgor. No rashes and no  jaundice  Musculoskeletal: no lower extremity pitting edema present.   Neurologic: Awake, alert.   Neuropsychiatric: Appropriate mood, affect and eye contact. Cooperative.       Primary Care Physician   Physician No Ref-Primary    Discharge Orders      Hospital to Primary Care - Establish PCP Referral      Reason for your hospital stay    Acute     Activity    Your activity upon discharge: activity as tolerated     Diet    Follow this diet upon discharge:  Low Fat Diet (up to 50g)       Significant Results and Procedures   Most Recent 3 CBC's:  Recent Labs   Lab Test 06/07/25  0653 06/05/25  0557 06/03/25  0605   WBC 6.1 6.5 9.1   HGB 12.5* 13.3 14.2   MCV 84 83 83    237 275     Most Recent 3 BMP's:  Recent Labs   Lab Test 06/07/25  0653 06/06/25  0614 06/05/25  1432 06/05/25  0557    140  --  140   POTASSIUM 3.9 3.7  --  4.0   CHLORIDE 103 105  --  103   CO2 28 27  --  27   BUN 3.3* 3.3*  --  2.8*   CR 0.88 0.94  --  0.89   ANIONGAP 8 8  --  10   SONYA 9.2 9.4  --  9.7   GLC 96 118* 92 107*     Most Recent 2 LFT's:  Recent Labs   Lab Test 06/07/25  0653 06/06/25  0614   * 177*   * 330*   ALKPHOS 171* 199*   BILITOTAL 1.2 1.9*   ,   Results for orders placed or performed during the hospital encounter of 06/04/25   XR Surgery MADELAINE Fluoro L/T 5 Min    Narrative    This exam was marked as non-reportable because it will not be read by a   radiologist or a [facility] non-radiologist provider.             Discharge Medications   Current Discharge Medication List        START taking these medications    Details   acetaminophen (TYLENOL) 500 MG tablet Take 1-2 tablets (500-1,000 mg) by mouth every 6 hours as needed for mild pain.    Associated Diagnoses: Choledocholithiasis; Epigastric pain      ibuprofen (ADVIL/MOTRIN) 600 MG tablet Take 1 tablet (600 mg) by mouth every 6 hours as needed for moderate pain.    Associated Diagnoses: Choledocholithiasis; Epigastric pain      polyethylene glycol  (MIRALAX) 17 GM/Dose powder Take 17 g by mouth daily as needed for constipation.    Associated Diagnoses: Drug-induced constipation           CONTINUE these medications which have NOT CHANGED    Details   FIBER ADULT GUMMIES PO Take 1 tablet by mouth every morning. Take 1 gummy po qam after meals           Allergies   No Known Allergies

## 2025-06-07 NOTE — PLAN OF CARE
Problem: Adult Inpatient Plan of Care  Goal: Absence of Hospital-Acquired Illness or Injury  Intervention: Prevent Infection  Recent Flowsheet Documentation  Taken 6/7/2025 0738 by Serene Lizama RN  Infection Prevention:   hand hygiene promoted   single patient room provided     Problem: Pain Acute  Goal: Optimal Pain Control and Function  Outcome: Met  Intervention: Develop Pain Management Plan  Recent Flowsheet Documentation  Taken 6/7/2025 0720 by Serene Lizama RN  Pain Management Interventions: medication (see MAR)  Intervention: Prevent or Manage Pain  Recent Flowsheet Documentation  Taken 6/7/2025 0738 by Serene Lizama RN  Medication Review/Management: medications reviewed     Problem: Surgery Nonspecified  Goal: Fluid and Electrolyte Balance  Outcome: Met     Problem: Surgery Nonspecified  Goal: Optimal Pain Control and Function  Outcome: Met  Intervention: Prevent or Manage Pain  Recent Flowsheet Documentation  Taken 6/7/2025 0720 by Serene Lizama RN  Pain Management Interventions: medication (see MAR)     Problem: Surgery Nonspecified  Goal: Effective Urinary Elimination  Outcome: Met   Goal Outcome Evaluation:       Pt A&O pain 3/10 manageable with PO 0.3 mg of dilaudid, tolerating low fat diet/liquids, K protocol (3.9) Independent to bathroom, infusing NS 75 ml/hr. Discharge paperwork reviewed and all questions answered.

## 2025-06-10 ENCOUNTER — PATIENT OUTREACH (OUTPATIENT)
Dept: CARE COORDINATION | Facility: CLINIC | Age: 25
End: 2025-06-10

## 2025-06-10 NOTE — PROGRESS NOTES
[facility] Contact  [facility]/Voicemail     Clinical Data: Post-Discharge Outreach     Outreach attempted x 2.  Unable to reach, Incase Pt called to check if he has any questions/concerns and/or need to schedule an appt with an [facility] provider, if they do not have a PCP.      Plan:  [facility] will do no further outreaches at this time.         ZAFAR Giron  [phone]  [facility]

## (undated) DEVICE — SU VICRYL+ 0 27IN CT-1 UND VCP260H

## (undated) DEVICE — ESU GROUND PAD ADULT REM W/15' CORD E7507DB

## (undated) DEVICE — TUBING SMOKE EVAC PNEUMOCLEAR HIGH FLOW 0620050250

## (undated) DEVICE — SUTURE PASSOR W/GUIDE RSG-14F-4-WG

## (undated) DEVICE — ENDO POUCH UNIV RETRIEVAL SYSTEM INZII 10MM CD001

## (undated) DEVICE — GOWN LG DISP 9515

## (undated) DEVICE — ENDO FUSION OMNI-TOME G31903

## (undated) DEVICE — ENDO TROCAR FIRST ENTRY KII FIOS Z-THRD 05X100MM CTF03

## (undated) DEVICE — ENDO TROCAR SLEEVE KII Z-THREADED 05X100MM CTS02

## (undated) DEVICE — SOL WATER IRRIG 1000ML BOTTLE 2F7114

## (undated) DEVICE — SUCTION MANIFOLD NEPTUNE 2 SYS 1 PORT 702-025-000

## (undated) DEVICE — PREP CHLORAPREP 26ML TINTED HI-LITE ORANGE 930815

## (undated) DEVICE — CLIP APPLIER ENDO ROTATING 10MM MED/LG ER320

## (undated) DEVICE — NDL INSUFFLATION 13GA 120MM C2201

## (undated) DEVICE — BALLOON EXTRACTION 15X1950MM 3.2MM TL B-V243Q-A

## (undated) DEVICE — CLIP LIGACLIP LG YELLOW LT400

## (undated) DEVICE — VIAL DECANTER STERILE WHITE DYNJDEC06

## (undated) DEVICE — ELECTRODE PATIENT RETURN ADULT L10 FT 2 PLATE CORD 0855C

## (undated) DEVICE — SUCTION STRYKERFLOW II 250-070-500

## (undated) DEVICE — SOL RINGERS LACTATED 1000ML BAG 2B2324X

## (undated) DEVICE — CUSTOM PACK LAP CHOLE SBA5BLCHEA

## (undated) DEVICE — WIRE GUIDE 0.35X270CM STRAIGHT TIP VISIGLIDE G-260-3527S

## (undated) DEVICE — ENDO TROCAR FIRST ENTRY KII FIOS Z-THRD 11X100MM CTF33

## (undated) DEVICE — ESU CORD MONOPOLAR 10'  E0510

## (undated) DEVICE — TUBING SUCTION MEDI-VAC 1/4"X20' N620A

## (undated) DEVICE — SU MONOCRYL+ 4-0 18IN PS2 UND MCP496G

## (undated) DEVICE — ENDO SHEARS RENEW LAP ENDOCUT SCISSOR TIP 16.5MM 3142

## (undated) DEVICE — GLOVE PI ULTRATCH M LF SZ 6.5 PF CUFF TEXT STRL LF 42665

## (undated) RX ORDER — FENTANYL CITRATE 50 UG/ML
INJECTION, SOLUTION INTRAMUSCULAR; INTRAVENOUS
Status: DISPENSED
Start: 2025-05-08

## (undated) RX ORDER — ONDANSETRON 2 MG/ML
INJECTION INTRAMUSCULAR; INTRAVENOUS
Status: DISPENSED
Start: 2025-05-08

## (undated) RX ORDER — KETOROLAC TROMETHAMINE 30 MG/ML
INJECTION, SOLUTION INTRAMUSCULAR; INTRAVENOUS
Status: DISPENSED
Start: 2025-05-08

## (undated) RX ORDER — PROPOFOL 10 MG/ML
INJECTION, EMULSION INTRAVENOUS
Status: DISPENSED
Start: 2025-05-08

## (undated) RX ORDER — FENTANYL CITRATE 50 UG/ML
INJECTION, SOLUTION INTRAMUSCULAR; INTRAVENOUS
Status: DISPENSED
Start: 2025-06-05

## (undated) RX ORDER — LIDOCAINE HYDROCHLORIDE 10 MG/ML
INJECTION, SOLUTION EPIDURAL; INFILTRATION; INTRACAUDAL; PERINEURAL
Status: DISPENSED
Start: 2025-05-08